# Patient Record
Sex: MALE | Race: WHITE | URBAN - METROPOLITAN AREA
[De-identification: names, ages, dates, MRNs, and addresses within clinical notes are randomized per-mention and may not be internally consistent; named-entity substitution may affect disease eponyms.]

---

## 2017-05-24 ENCOUNTER — HOSPITAL ENCOUNTER (INPATIENT)
Facility: CLINIC | Age: 42
LOS: 2 days | Discharge: HOME | DRG: 917 | End: 2017-05-26
Attending: EMERGENCY MEDICINE | Admitting: INTERNAL MEDICINE
Payer: COMMERCIAL

## 2017-05-24 ENCOUNTER — HOSPITAL ENCOUNTER (EMERGENCY)
Facility: CLINIC | Age: 42
Discharge: HOME | DRG: 917 | End: 2017-05-24
Admitting: EMERGENCY MEDICINE
Payer: COMMERCIAL

## 2017-05-24 ENCOUNTER — RESULTS ONLY (OUTPATIENT)
Dept: EMERGENCY MEDICINE | Facility: CLINIC | Age: 42
End: 2017-05-24

## 2017-05-24 ENCOUNTER — APPOINTMENT (OUTPATIENT)
Dept: CT IMAGING | Facility: CLINIC | Age: 42
DRG: 917 | End: 2017-05-24
Attending: EMERGENCY MEDICINE
Payer: COMMERCIAL

## 2017-05-24 DIAGNOSIS — F33.2 SEVERE EPISODE OF RECURRENT MAJOR DEPRESSIVE DISORDER, WITHOUT PSYCHOTIC FEATURES (H): Primary | ICD-10-CM

## 2017-05-24 DIAGNOSIS — R56.9 SEIZURES (H): ICD-10-CM

## 2017-05-24 DIAGNOSIS — T50.902A INTENTIONAL DRUG OVERDOSE, INITIAL ENCOUNTER (H): ICD-10-CM

## 2017-05-24 DIAGNOSIS — G90.81 SEROTONIN SYNDROME: ICD-10-CM

## 2017-05-24 LAB
ALBUMIN SERPL-MCNC: 3.9 G/DL (ref 3.4–5)
ALP SERPL-CCNC: 74 U/L (ref 40–150)
ALT SERPL W P-5'-P-CCNC: 35 U/L (ref 0–70)
AMPHETAMINES UR QL SCN: ABNORMAL
ANION GAP SERPL CALCULATED.3IONS-SCNC: 11 MMOL/L (ref 3–14)
ANION GAP SERPL CALCULATED.3IONS-SCNC: 16 MMOL/L (ref 3–14)
APAP SERPL-MCNC: NORMAL MG/L (ref 10–20)
APAP SERPL-MCNC: NORMAL MG/L (ref 10–20)
AST SERPL W P-5'-P-CCNC: 18 U/L (ref 0–45)
BARBITURATES UR QL: ABNORMAL
BASOPHILS # BLD AUTO: 0 10E9/L (ref 0–0.2)
BASOPHILS NFR BLD AUTO: 0.2 %
BENZODIAZ UR QL: ABNORMAL
BILIRUB SERPL-MCNC: 0.3 MG/DL (ref 0.2–1.3)
BUN SERPL-MCNC: 12 MG/DL (ref 7–30)
BUN SERPL-MCNC: 13 MG/DL (ref 7–30)
CALCIUM SERPL-MCNC: 8.3 MG/DL (ref 8.5–10.1)
CALCIUM SERPL-MCNC: 8.4 MG/DL (ref 8.5–10.1)
CANNABINOIDS UR QL SCN: ABNORMAL
CHLORIDE SERPL-SCNC: 108 MMOL/L (ref 94–109)
CHLORIDE SERPL-SCNC: 108 MMOL/L (ref 94–109)
CO2 SERPL-SCNC: 18 MMOL/L (ref 20–32)
CO2 SERPL-SCNC: 23 MMOL/L (ref 20–32)
COCAINE UR QL: ABNORMAL
CREAT SERPL-MCNC: 0.95 MG/DL (ref 0.66–1.25)
CREAT SERPL-MCNC: 1.08 MG/DL (ref 0.66–1.25)
DIFFERENTIAL METHOD BLD: NORMAL
EOSINOPHIL # BLD AUTO: 0.2 10E9/L (ref 0–0.7)
EOSINOPHIL NFR BLD AUTO: 1.7 %
ERYTHROCYTE [DISTWIDTH] IN BLOOD BY AUTOMATED COUNT: 13.2 % (ref 10–15)
ETHANOL SERPL-MCNC: <0.01 G/DL
ETHANOL SERPL-MCNC: NORMAL G/DL
GFR SERPL CREATININE-BSD FRML MDRD: 0 ML/MIN/1.7M2
GFR SERPL CREATININE-BSD FRML MDRD: 75 ML/MIN/1.7M2
GLUCOSE BLDC GLUCOMTR-MCNC: 78 MG/DL (ref 70–99)
GLUCOSE SERPL-MCNC: 123 MG/DL (ref 70–99)
GLUCOSE SERPL-MCNC: 97 MG/DL (ref 70–99)
HCT VFR BLD AUTO: 43.8 % (ref 40–53)
HGB BLD-MCNC: 14.6 G/DL (ref 13.3–17.7)
IMM GRANULOCYTES # BLD: 0 10E9/L (ref 0–0.4)
IMM GRANULOCYTES NFR BLD: 0.2 %
INTERPRETATION ECG - MUSE: NORMAL
INTERPRETATION ECG - MUSE: NORMAL
LYMPHOCYTES # BLD AUTO: 1.6 10E9/L (ref 0.8–5.3)
LYMPHOCYTES NFR BLD AUTO: 18.6 %
MAGNESIUM SERPL-MCNC: 2.5 MG/DL (ref 1.6–2.3)
MCH RBC QN AUTO: 30.4 PG (ref 26.5–33)
MCHC RBC AUTO-ENTMCNC: 33.3 G/DL (ref 31.5–36.5)
MCV RBC AUTO: 91 FL (ref 78–100)
MONOCYTES # BLD AUTO: 0.8 10E9/L (ref 0–1.3)
MONOCYTES NFR BLD AUTO: 8.7 %
NEUTROPHILS # BLD AUTO: 6.1 10E9/L (ref 1.6–8.3)
NEUTROPHILS NFR BLD AUTO: 70.6 %
OPIATES UR QL SCN: ABNORMAL
PCP UR QL SCN: ABNORMAL
PLATELET # BLD AUTO: 322 10E9/L (ref 150–450)
POTASSIUM SERPL-SCNC: 3.7 MMOL/L (ref 3.4–5.3)
POTASSIUM SERPL-SCNC: 3.9 MMOL/L (ref 3.4–5.3)
PROT SERPL-MCNC: 7.1 G/DL (ref 6.8–8.8)
RBC # BLD AUTO: 4.81 10E12/L (ref 4.4–5.9)
SALICYLATES SERPL-MCNC: NORMAL MG/DL
SODIUM SERPL-SCNC: 142 MMOL/L (ref 133–144)
SODIUM SERPL-SCNC: 142 MMOL/L (ref 133–144)
TSH SERPL DL<=0.005 MIU/L-ACNC: 3.39 MU/L (ref 0.4–4)
WBC # BLD AUTO: 8.7 10E9/L (ref 4–11)

## 2017-05-24 PROCEDURE — 25000128 H RX IP 250 OP 636: Performed by: PSYCHIATRY & NEUROLOGY

## 2017-05-24 PROCEDURE — 93005 ELECTROCARDIOGRAM TRACING: CPT | Mod: 76

## 2017-05-24 PROCEDURE — 80329 ANALGESICS NON-OPIOID 1 OR 2: CPT | Performed by: EMERGENCY MEDICINE

## 2017-05-24 PROCEDURE — 80307 DRUG TEST PRSMV CHEM ANLYZR: CPT | Performed by: EMERGENCY MEDICINE

## 2017-05-24 PROCEDURE — 25000128 H RX IP 250 OP 636

## 2017-05-24 PROCEDURE — 85025 COMPLETE CBC W/AUTO DIFF WBC: CPT | Performed by: EMERGENCY MEDICINE

## 2017-05-24 PROCEDURE — 99223 1ST HOSP IP/OBS HIGH 75: CPT | Mod: AI | Performed by: INTERNAL MEDICINE

## 2017-05-24 PROCEDURE — 20000003 ZZH R&B ICU

## 2017-05-24 PROCEDURE — 84443 ASSAY THYROID STIM HORMONE: CPT | Performed by: EMERGENCY MEDICINE

## 2017-05-24 PROCEDURE — 25000128 H RX IP 250 OP 636: Performed by: EMERGENCY MEDICINE

## 2017-05-24 PROCEDURE — 70450 CT HEAD/BRAIN W/O DYE: CPT

## 2017-05-24 PROCEDURE — 80053 COMPREHEN METABOLIC PANEL: CPT | Performed by: EMERGENCY MEDICINE

## 2017-05-24 PROCEDURE — 25000125 ZZHC RX 250: Performed by: INTERNAL MEDICINE

## 2017-05-24 PROCEDURE — 00000146 ZZHCL STATISTIC GLUCOSE BY METER IP

## 2017-05-24 PROCEDURE — 25000125 ZZHC RX 250: Performed by: EMERGENCY MEDICINE

## 2017-05-24 PROCEDURE — 99291 CRITICAL CARE FIRST HOUR: CPT | Mod: 25

## 2017-05-24 PROCEDURE — 93005 ELECTROCARDIOGRAM TRACING: CPT

## 2017-05-24 PROCEDURE — 99292 CRITICAL CARE ADDL 30 MIN: CPT

## 2017-05-24 PROCEDURE — 83735 ASSAY OF MAGNESIUM: CPT | Performed by: EMERGENCY MEDICINE

## 2017-05-24 PROCEDURE — 80320 DRUG SCREEN QUANTALCOHOLS: CPT | Performed by: EMERGENCY MEDICINE

## 2017-05-24 RX ORDER — LIDOCAINE 40 MG/G
CREAM TOPICAL
Status: DISCONTINUED | OUTPATIENT
Start: 2017-05-24 | End: 2017-05-26 | Stop reason: HOSPADM

## 2017-05-24 RX ORDER — NALOXONE HYDROCHLORIDE 0.4 MG/ML
.1-.4 INJECTION, SOLUTION INTRAMUSCULAR; INTRAVENOUS; SUBCUTANEOUS
Status: DISCONTINUED | OUTPATIENT
Start: 2017-05-24 | End: 2017-05-26 | Stop reason: HOSPADM

## 2017-05-24 RX ORDER — SODIUM CHLORIDE 9 MG/ML
1000 INJECTION, SOLUTION INTRAVENOUS CONTINUOUS
Status: DISCONTINUED | OUTPATIENT
Start: 2017-05-24 | End: 2017-05-24

## 2017-05-24 RX ORDER — ONDANSETRON 2 MG/ML
4 INJECTION INTRAMUSCULAR; INTRAVENOUS EVERY 6 HOURS PRN
Status: DISCONTINUED | OUTPATIENT
Start: 2017-05-24 | End: 2017-05-26 | Stop reason: HOSPADM

## 2017-05-24 RX ORDER — ONDANSETRON 4 MG/1
4 TABLET, ORALLY DISINTEGRATING ORAL EVERY 6 HOURS PRN
Status: DISCONTINUED | OUTPATIENT
Start: 2017-05-24 | End: 2017-05-26 | Stop reason: HOSPADM

## 2017-05-24 RX ORDER — POTASSIUM CHLORIDE 29.8 MG/ML
20 INJECTION INTRAVENOUS
Status: DISCONTINUED | OUTPATIENT
Start: 2017-05-24 | End: 2017-05-26 | Stop reason: HOSPADM

## 2017-05-24 RX ORDER — POTASSIUM CHLORIDE 1500 MG/1
20-40 TABLET, EXTENDED RELEASE ORAL
Status: DISCONTINUED | OUTPATIENT
Start: 2017-05-24 | End: 2017-05-26 | Stop reason: HOSPADM

## 2017-05-24 RX ORDER — LORAZEPAM 2 MG/ML
2 INJECTION INTRAMUSCULAR
Status: DISCONTINUED | OUTPATIENT
Start: 2017-05-24 | End: 2017-05-26 | Stop reason: HOSPADM

## 2017-05-24 RX ORDER — ACETAMINOPHEN 650 MG/1
650 SUPPOSITORY RECTAL EVERY 4 HOURS PRN
Status: DISCONTINUED | OUTPATIENT
Start: 2017-05-24 | End: 2017-05-26 | Stop reason: HOSPADM

## 2017-05-24 RX ORDER — AMOXICILLIN 250 MG
1-2 CAPSULE ORAL 2 TIMES DAILY PRN
Status: DISCONTINUED | OUTPATIENT
Start: 2017-05-24 | End: 2017-05-26 | Stop reason: HOSPADM

## 2017-05-24 RX ORDER — POTASSIUM CHLORIDE 7.45 MG/ML
10 INJECTION INTRAVENOUS
Status: DISCONTINUED | OUTPATIENT
Start: 2017-05-24 | End: 2017-05-26 | Stop reason: HOSPADM

## 2017-05-24 RX ORDER — SODIUM CHLORIDE 9 MG/ML
INJECTION, SOLUTION INTRAVENOUS CONTINUOUS
Status: DISCONTINUED | OUTPATIENT
Start: 2017-05-24 | End: 2017-05-24

## 2017-05-24 RX ORDER — LORAZEPAM 2 MG/ML
1 INJECTION INTRAMUSCULAR EVERY 6 HOURS
Status: DISCONTINUED | OUTPATIENT
Start: 2017-05-24 | End: 2017-05-25

## 2017-05-24 RX ORDER — LORAZEPAM 2 MG/ML
2 INJECTION INTRAMUSCULAR EVERY 5 MIN PRN
Status: DISCONTINUED | OUTPATIENT
Start: 2017-05-24 | End: 2017-05-24

## 2017-05-24 RX ORDER — POTASSIUM CHLORIDE 1.5 G/1.58G
20-40 POWDER, FOR SOLUTION ORAL
Status: DISCONTINUED | OUTPATIENT
Start: 2017-05-24 | End: 2017-05-26 | Stop reason: HOSPADM

## 2017-05-24 RX ORDER — POTASSIUM CL/LIDO/0.9 % NACL 10MEQ/0.1L
10 INTRAVENOUS SOLUTION, PIGGYBACK (ML) INTRAVENOUS
Status: DISCONTINUED | OUTPATIENT
Start: 2017-05-24 | End: 2017-05-26 | Stop reason: HOSPADM

## 2017-05-24 RX ORDER — ACETAMINOPHEN 325 MG/1
650 TABLET ORAL EVERY 4 HOURS PRN
Status: DISCONTINUED | OUTPATIENT
Start: 2017-05-24 | End: 2017-05-26 | Stop reason: HOSPADM

## 2017-05-24 RX ORDER — CITALOPRAM HYDROBROMIDE 20 MG/10ML
40 SOLUTION, ORAL ORAL DAILY
COMMUNITY
End: 2017-05-24

## 2017-05-24 RX ORDER — LORAZEPAM 2 MG/ML
INJECTION INTRAMUSCULAR
Status: COMPLETED
Start: 2017-05-24 | End: 2017-05-24

## 2017-05-24 RX ORDER — LIDOCAINE 40 MG/G
CREAM TOPICAL
Status: DISCONTINUED | OUTPATIENT
Start: 2017-05-24 | End: 2017-05-24

## 2017-05-24 RX ORDER — LORAZEPAM 2 MG/ML
1 INJECTION INTRAMUSCULAR ONCE
Status: COMPLETED | OUTPATIENT
Start: 2017-05-24 | End: 2017-05-24

## 2017-05-24 RX ADMIN — SODIUM CHLORIDE 1000 ML: 9 INJECTION, SOLUTION INTRAVENOUS at 11:41

## 2017-05-24 RX ADMIN — SODIUM CHLORIDE 1000 ML: 9 INJECTION, SOLUTION INTRAVENOUS at 12:43

## 2017-05-24 RX ADMIN — LORAZEPAM 1 MG: 2 INJECTION INTRAMUSCULAR; INTRAVENOUS at 13:56

## 2017-05-24 RX ADMIN — SODIUM CHLORIDE 1000 ML: 9 INJECTION, SOLUTION INTRAVENOUS at 13:46

## 2017-05-24 RX ADMIN — LORAZEPAM 1 MG: 2 INJECTION INTRAMUSCULAR; INTRAVENOUS at 16:21

## 2017-05-24 RX ADMIN — Medication 2 G: at 11:42

## 2017-05-24 RX ADMIN — LORAZEPAM 1 MG: 2 INJECTION INTRAMUSCULAR; INTRAVENOUS at 12:00

## 2017-05-24 RX ADMIN — LORAZEPAM 1 MG: 2 INJECTION INTRAMUSCULAR; INTRAVENOUS at 11:30

## 2017-05-24 RX ADMIN — DEXTROSE AND SODIUM CHLORIDE: 5; 900 INJECTION, SOLUTION INTRAVENOUS at 20:10

## 2017-05-24 ASSESSMENT — ENCOUNTER SYMPTOMS
HALLUCINATIONS: 1
SEIZURES: 1
NERVOUS/ANXIOUS: 1

## 2017-05-24 NOTE — PHARMACY-ADMISSION MEDICATION HISTORY
Admission medication history interview status for the 5/24/2017  admission is complete. See EPIC admission navigator for prior to admission medications     Medication history source reliability:Good    Actions taken by pharmacist (provider contacted, etc): called [facility] to verify prescription medications.   Last fill of Celexa was 4/24/17 for a 30 day supply.  Last fill for Wellbutrin was 5/20/17 for a 30 day supply.      Additional medication history information not noted on PTA med list :None    Medication reconciliation/reorder completed by provider prior to medication history? No    Time spent in this activity: 15 min    Prior to Admission medications    Medication Sig Last Dose Taking? Auth Provider   BuPROPion HCl (WELLBUTRIN XL PO) Take 300 mg by mouth daily unknown Yes Unknown, Entered By History   Citalopram Hydrobromide (CELEXA PO) Take 40 mg by mouth daily unknown Yes Unknown, Entered By History

## 2017-05-24 NOTE — IP AVS SNAPSHOT
MRN:[id number]                      After Visit Summary   5/24/2017    Malik Smiley    MRN: [id number]           Thank you!     Thank you for choosing [facility] for your care. Our goal is always to provide you with excellent care. Hearing back from our patients is one way we can continue to improve our services. Please take a few minutes to complete the written survey that you may receive in the mail after you visit with us. Thank you!        Patient Information     Date Of Birth          1975        Designated Caregiver       Most Recent Value    Caregiver    Will someone help with your care after discharge? yes    Name of designated caregiver manju    Phone number of caregiver [phone]    Caregiver [place]      About your hospital stay     You were admitted on:  May 24, 2017 You last received care in the:  [facility]    You were discharged on:  May 26, 2017        Reason for your hospital stay       You have been admitted for evaluation after drug overdose with Wellbutrin you were seen by Neurology and monitored in ICU initially; your condition has improved; you were seen by Psychiatry who recommended you to stop Wellbutrin and continue with Celexa at a lower dose 20 mg po daily and to follow up with your psychiatrist soon.                  Who to Call     For medical emergencies, please call 911.  For non-urgent questions about your medical care, please call your primary care provider or clinic, [phone]          Attending Provider     Provider Specialty    Ted Hood DO Emergency Medicine    Lilian Mukherjee MD Internal Medicine       Primary Care Provider Office Phone # Fax #    Teo Morales [phone]       [address] [place] [place] [address]         When to contact your care team       Call your primary doctor or return to ER if you have any of the following:  "temperature greater than 100.5 or less than 95, worsening depression, suicidal ideation, agitation, confusion, hallucinations, dizziness, loss of consciousness, seizures, nausea, vomiting..                  After Care Instructions     Activity       Your activity upon discharge: activity as tolerated and no driving for 2 days or until mentation clears completely.            Diet       Follow this diet upon discharge: Orders Placed This Encounter      Room Service      Advance Diet as Tolerated: Regular Diet Adult                  Follow-up Appointments     Follow-up and recommended labs and tests        Follow up with primary care provider, Teo Morales, 8am on Friday June 2nd, (within 7 days for hospital follow- up).  No follow up labs or test are needed.    Follow up with primary Psychiatrist Dr Rodgers on Tuesday May 30st at 9:20 am then on July 10th.  Please call the clinic [phone] you have questions about your appointment.                  Pending Results     No orders found from 5/22/2017 to 5/25/2017.            Statement of Approval     Ordered          05/26/17 1328  I have reviewed and agree with all the recommendations and orders detailed in this document.  EFFECTIVE NOW     Approved and electronically signed by:  Lilian Mukherjee MD             Admission Information     Date & Time Provider Department Dept. Phone    5/24/2017 Lilian Mukherjee MD [address] [facility] [phone]      Your Vitals Were     Blood Pressure Pulse Temperature Respirations Height Weight    116/68 (BP Location: Right arm) 136 98.5  F (36.9  C) (Oral) 16 1.778 m (5' 10\") 82.6 kg (182 lb 1.6 oz)    Pulse Oximetry BMI (Body Mass Index)                94% 26.13 kg/m2          [employer] Information     [employer] lets you send messages to your doctor, view your test results, renew your prescriptions, schedule appointments and more. To sign up, go to [email or web address] . Click on \"Log in\" on " "the left side of the screen, which will take you to the Welcome page. Then click on \"Sign up Now\" on the right side of the page.     You will be asked to enter the access code listed below, as well as some personal information. Please follow the directions to create your username and password.     Your access code is: [email or web address]  Expires: 2017  1:08 PM     Your access code will  in 90 days. If you need help or a new code, please call your [facility] clinic or [phone].        Care EveryWhere ID     This is your Care EveryWhere ID. This could be used by other organizations to access your [facility] medical records  [id number]           Review of your medicines      CONTINUE these medicines which may have CHANGED, or have new prescriptions. If we are uncertain of the size of tablets/capsules you have at home, strength may be listed as something that might have changed.        Dose / Directions    citalopram 20 MG tablet   Commonly known as:  celeXA   This may have changed:    - medication strength  - how much to take   Used for:  Severe episode of recurrent major depressive disorder, without psychotic features (H)        Dose:  20 mg   Take 1 tablet (20 mg) by mouth daily   Quantity:  60 tablet   Refills:  0         STOP taking     WELLBUTRIN XL PO                Where to get your medicines      These medications were sent to [facility] [place], [place] - [address]  [address], [place] [address]     Phone:  [phone]     citalopram 20 MG tablet                Protect others around you: Learn how to safely use, store and throw away your medicines at www.disposemymeds.org.             Medication List: This is a list of all your medications and when to take them. Check marks below indicate your daily home schedule. Keep this list as a reference.      Medications           Morning Afternoon Evening Bedtime As Needed    citalopram 20 MG tablet   Commonly known as:  celeXA "   Take 1 tablet (20 mg) by mouth daily   Last time this was given:  10 mg on 5/26/2017  1:17 PM

## 2017-05-24 NOTE — PLAN OF CARE
Problem: Goal Outcome Summary  Goal: Goal Outcome Summary  Outcome: Improving  Pt alert and oriented to person and place. Calm and cooperative, follows direction. Slow to respond, forgetful and continues w/ visual hallucinations.Pt seeing bugs and people in the room. Bilateral upper extremity tremors and clonus noted to bilateral feet. Roving eye movements.  Received 1mg of scheduled IV ativan this shift. Bite marks to lateral tongue edges. No seizure activity noted, precautions in place. IVF infusing. Family was present at bedside and updated. Neurology following and psychiatry to consult. Continue to monitor.

## 2017-05-24 NOTE — ED NOTES
Pt had seizure during transport up ICU. Dr Hood aware. Will return to [facility] for further evaluation

## 2017-05-24 NOTE — ED NOTES
[facility]  ED Nurse Handoff Report    ED Chief complaint: Seizures (Pt seen in ED last nigjht for Wellbutrin OD. Sent home at 0600 with family. Pt had a witnessed seizure at home and by medics lasting approx 1 minute. Hallucinating at 1030 per family.)      ED Diagnosis:   Final diagnoses:   Seizures (H)       Code Status: Full Code    Allergies: No Known Allergies    Activity level - Baseline/Home:  Stand with Assist    Activity Level - Current:   Stand with Assist     Needed?: No    Isolation: No  Infection: Not Applicable    Bariatric?: No    Vital Signs:   Vitals:    05/24/17 1145 05/24/17 1200 05/24/17 1219 05/24/17 1228   BP: 133/86 127/84 118/76    Pulse:       Resp: 22 15 13    Temp:    98.8  F (37.1  C)   TempSrc:    Oral   SpO2: 95% 96% 97%    Height:           Cardiac Rhythm: ,   Cardiac  Cardiac Rhythm: Sinus tachycardia    Pain level:      Is this patient confused?: Yes    Patient Report: Initial Complaint: 911 was called by mother stating he a seizure and was confused.  Focused Assessment: Seizure noted per EMS. Pt tremorous with clonus on arrival. Asking repetitive questions. Confused to date. Hallucinations at home.  Tests Performed: CT, Labs. Unable to get urine at this time.  Abnormal Results: see fcis  Treatments provided: Fluids, See MAR    Family Comments: Wife, sister and mother at the bedside    OBS brochure/video discussed/provided to patient: N/A    ED Medications:   Medications   lidocaine 1 % 1 mL (not administered)   lidocaine (LMX4) cream (not administered)   sodium chloride (PF) 0.9% PF flush 3 mL (not administered)   sodium chloride (PF) 0.9% PF flush 3 mL (not administered)   LORazepam (ATIVAN) injection 2 mg (1 mg IV/IM Given 5/24/17 1130)   0.9% sodium chloride BOLUS (0 mLs Intravenous Stopped 5/24/17 1241)     Followed by   0.9% sodium chloride infusion (not administered)   0.9% sodium chloride BOLUS (1,000 mLs Intravenous New Bag 5/24/17 1243)    magnesium sulfate 2 g in NS intermittent infusion ([facility] or [facility]) (0 g Intravenous Stopped 5/24/17 1220)   LORazepam (ATIVAN) injection 1 mg (1 mg Intravenous Given 5/24/17 1200)       Drips infusing?:  No      ED NURSE PHONE NUMBER: [phone]

## 2017-05-24 NOTE — ED PROVIDER NOTES
History     Chief Complaint:  Seizures      HPI   Malik Smiley is a 42 year old male with a past medical history of depression and anxeity who presents via EMS for evaluation of seizures. The patient was seen in the ED yesterday after he called his wife who was out of town and told her he overdosed on his medications. Wife called the police and was concerned that he overdosed on Wellbutrin.  The patient did deny taking any pills at that time. After observation and negative lab workup the patient was discharged home. At 10am he had a witnessed seizure today at home; 911 was called. By the time EMS got there, the patient s seizure has resolved. He did have another seizure with mostly arms and body shaking and was brought to the ED for further evaluation. EMS report the patient is confused, but was alert and talking. Here in the ED, the patient reports he had taken about twenty pills of either Celexa or Wellbutrin in suicide attempt. He denies any pain. The patient did recently start a new job after staying home with his son for nine months and this has been causing him increased anxiety. He also stopped using marijuana a few months ago. The patient has been to an outpatient program in the past for depression, but does not have a regular therapist.      Mother and wife are at bedside. Mother reports the patient was downstairs this morning around 1000 when she started to hear noises coming from him.  The patient told her there were mice under the bed and he was trying to get them out, but mother did not see anything. Family was able to witness a seizure which lasted for 30 seconds to a minute. The patient s whole body was shaking during his seizure and mother called 911. Family is unsure if there are pills missing from his prescription bottles or if the patient took pills after being discharged from the hospital last night. Wife denies any history of seizures and the patient has not attempted suicide in the past.  "Wife states the patient had a couple of drinks last night, but is not sure how much.     Allergies:  NKDA    Medications:    Celexa  Wellbutrin    Past Medical History:    Depression  Anxiety   Past Surgical History:    History reviewed. No pertinent past surgical history.     Family History:    History reviewed.  No significant family history.     Social History:  Marital Status:     Smoking status: Unknown  Alcohol use: Yes     Review of Systems   Neurological: Positive for seizures.   Psychiatric/Behavioral: Positive for hallucinations and suicidal ideas. The patient is nervous/anxious.      Physical Exam   First Vitals:  BP: 148/90  Pulse: 136  Heart Rate: 138  Temp: 98.8  F (37.1  C)  Resp: 19  Height: 177.8 cm (5' 10\")  SpO2: 94 %    Physical Exam  General: Alert and cooperative with exam. Patient in moderate distress. Normal mentation.  Head:  Scalp is NC/AT  Eyes:  No scleral icterus, PERRL, EOMI   ENT:  The external nose and ears are normal. The oropharynx is normal and without erythema; mucus membranes are somewhat dry.  Neck:  Normal range of motion without rigidity.  CV:  Tachcycardic and regular rhythm  Resp:  Breath sounds are clear bilaterally    Non-labored, no retractions or accessory muscle use  GI:  Abdomen is soft, no distension, no tenderness. No peritoneal signs  MS:  No lower extremity edema   Skin:  Warm and dry, No rash or lesions noted.  Neuro: Oriented x 3. No gross motor deficits.     Strength and sensation grossly intact in all 4 extremities.       Cranial nerves 2-12 intact.     GCS: 15    Significant inducable clonus in lower ext, hyperreflexia  Psych:  Awake. Alert. Endorse depression with suicide attempt with intentional overdose, concern for visual hallucinations. Mildly agitated.     Emergency Department Course   ECG @ 1130  Indication: Seizures  Rate 138 bpm.   MD interval 138 ms.   QRS duration 98 ms.   QT/QTc 368/557 ms.   P-R-T axes 29.  Notes: Sinus tachycardia. " Nonspecific ST & T wave abnormality.    Time read 1135    ECG @ 1403  Indication: Repeat  Rate 133 bpm.   NY interval 146 ms.   QRS duration 98 ms.   QT/QTc 286/425 ms.   P-R-T axes 30.  Notes: Sinus tachycardia. Nonspecific T wave abnormality.   Time read 1451    Imaging:  Radiographic findings were communicated with the patient's wife and mother who voiced understanding of the findings.    CT Head w/o Contrast  Final Result  IMPRESSION: Normal head CT.      Radiation dose for this scan was reduced using automated exposure  control, adjustment of the mA and/or kV according to patient size, or  iterative reconstruction technique.    PENNY ROTHMAN MD      Laboratory:  CBC: WBC 8.7 (WNL) HGB 14.6 (WNL)  (WNL)   CMP: Cr 1.08 (WNL) Glucose 123 (H) Carbon dioxide 18 (L) Anion gap 16 (H) Calcium 8.4 (L) Rest WNL  Magnesium: 2.5 (H)  Drug abuse screen: Amphetamine Positive (A) Rest Negative  Blood alcohol: <0.01 (WNL)  Acetaminophen: <2 (WNL)  Salicylate: <2 (WNL)  TSH: 3.39 (WNL)    Interventions:  1130: Ativan, 1 mg, IV  1141: Normal saline, 1000 ml, IV  1142: Magnesium, 2 g, IV  1243: Normal saline, 1000 ml, IV  1200: Ativan, 1 mg, IV  1243: Normal saline, 1000 ml, IV  1356:  Ativan, 1 mg, IV      ED Course:  The patient arrived by ambulance. He was escorted to the ED where his vitals were measured and recorded.   Nursing notes and past medical history reviewed.   I performed a physical examination of the patient as documented above.  I explained the plan with the patient's wife and mother who consents to this.   The above workups were undertaken.   The patient was placed on a cardiac monitor.  1140: Wife and mother are at bedside.   1150: The patient was discussed with poison control.  1246: The patient was discussed with Dr. Mukherjee of hospitalist service.   1358: The patient had another seizure while being brought upstairs and was given 1 mg ativan.   1400:  The patient was discussed with Dr. Mukherjee and she  recommended neurology consultation.   1406: The patient was discussed with Dr. Najera of neurology.   I personally reviewed the laboratory and imaging results with the Patient's wife and mother and answered all related questions prior to admission.  Findings and plan explained to the spouse and mother who consents to admission. Discussed the patient with Dr. Mukherjee, who will admit the patient to an adult ICU bed for further monitoring, evaluation, and treatment.     Impression & Plan      Medical Decision Making:  Malik Smiley is a 42 year old male who presents with reported seizure as well as recent history of intentional drug overdose (Wellbutrin and/or celexa). The patient's medical history and records were reviewed. Initial consideration for, but not limited to, seizure secondary to drug overdose, electrolyte abnormality, intracranial bleeds pathology, infectious process, arrhythmia, toxic ingestion, among others. Labs, EKG and imaging were obtained. Initial EKG demonstrates sinus tachycardia with prolonged QT, therefore the patient was provided with 2 g of magnesium IV. Initial seizure resolved right at the time of ED arrival. The patient was provided a total of 2 mg of Ativan initially. The patient did endorse intentional overdose, but is unable to quantify how much or what medication he may have taken. Family is concerned that he may have overdosed after discharge from ED yesterday. Additionally, the patient was noted to have hallucinations. The patient remained tachycardic throughout ED encounter and demonstrated evidence of significant of hyperreflexia as well as inducible colus on exam which is concerning for serotonin syndrome. The case was discussed with [place] poison control who recommended laboratory evaluation, supportive care with benzodiazepines and fluids PRN. Labs notable for amphetamine positive UDS, mild elevation of anion gap (16, likely secondary to seizure activity), negative alcohol  level, negative Tylenol/salicylate, TSH within normal range, and blood sugar greater than 70. Head CT is unremarkable for intracranial pathology. The patient will be admitted into the ICU with the hospitalist service. En route to ICU, the patient had a third witnessed seizure that lasted 30-60 seconds and aborted spontaneously. The patient was provided addition 1 mg of Ativan and observed for an additional 30 minutes in the ED. The case with discussed with Dr. Najera of neurology service at hospitalist request, she will evaluate the patient in the ICU.  Presentation is consistent with intentional drug overdose with associated serotonin syndrome and seizure. The patient will require inpatient psychiatric evaluation and he was placed on a 72 hour hold.     Critical care time: 45 minutes      Diagnosis:    ICD-10-CM    1. Seizures (H) R56.9 Drug abuse screen 77 urine ([place],[facility])     Glucose by meter     Glucose by meter   2. Serotonin syndrome G25.79    3. Intentional drug overdose, initial encounter (H) T50.902A        Disposition:   Admit to an adult ICU bed under the care of Dr. Mukherjee.       ILizbeth, am serving as a scribe on 5/24/2017 at 11:46 AM to personally document services performed by Ted Hood DO based on my observations and the provider's statements to me.         Ted Hood DO  05/24/17 4888

## 2017-05-24 NOTE — ED PROVIDER NOTES
CHIEF COMPLAINT:  Crisis evaluation.      HISTORY OF PRESENT ILLNESS:    Malik Smiley is a 42-year-old male with a history of anxiety and depression who presented to the Emergency Department for evaluation of anxiety and depression.  The patient states that he started a new job yesterday after being off for nine months to take care of his 13-month-old child.  He states that his wife has been out of town, and he has been having increased depression.  Tonight he texted his wife that he was going to take all his medications, and she subsequently called the police.  The patient adamantly denies that he took any of the pills.  The patient states that it was a mistake in saying this, and he notes that he would never try to hurt himself.  Tonight he did have 3-4 beers, and he states that he does not typically drink alcohol.  He also reports that he recently stopped using marijuana a few months ago.  In the past, the patient did complete an outpatient program for depression; however, he is not seeing a therapist.  He denies any physical complaints.  He last saw a psychiatrist about one-and-a-half months ago, and at that time he was restarted on Celexa.  He reports that he would never do anything to hurt himself, as he has a wife and a son. No prior suicide attempts. No prior psych hospitalizations. Denies drug use.      ALLERGIES:  No known drug allergies.      MEDICATIONS:  Wellbutrin, Celexa.      PAST MEDICAL HISTORY:  Depression, anxiety.      PHYSICAL EXAM:   VITAL SIGNS:  Blood pressure 98/71, pulse 79, temperature 97.6, oxygen saturation 97% on room air, respiratory rate 16.  At 4:23 a.m., repeat vital signs were heart rate of 82, respiratory rate 16, blood pressure 108/75, oxygen saturation 96% on room air.   GENERAL:  Resting comfortably on the gurney.   EYES:  Pupils are equal and round. Pupils mid point. Conjunctivae and sclerae are normal.   ENT:  NOSE, THROAT:  Moist mucous membranes.   NECK:  Normal range of  motion.   CARDIOVASCULAR:  Regular rate and rhythm.   SKIN:  Warm and well perfused.   RESPIRATORY:  Lungs are clear bilaterally with nonlabored breathing with no rales and no wheezing.   MUSCULOSKELETAL:  Normal muscular tone.   SKIN:  No rash or acute skin lesions noted.  No diaphoresis.   NEURO:  Awake and alert.  Speech is normal and fluent.  Face is symmetric.  Moves all extremities equally.   PSYCH:  Normal affect.  Good eye contact.  No evidence of anxiety or agitation.  Appropriate interactions here in the Emergency Department.     ROS: 10 point review of systems obtained and negative other than mentioned above     LABORATORY VALUES:  BMP within normal limits.  Tylenol negative less than 2.0.  Alcohol less than 0.01.      EKG shows sinus rhythm with a rate of 81, with QRS of 76, QTc of 457.      MEDICAL DECISION MAKING:  Malik Smiley is a 42-year-old male with history of depression and anxiety who presented to the Emergency Department for crisis evaluation.  Vital signs were normal on arrival to the Emergency Department.  He has an unremarkable physical exam.  He does not show any evidence of serotonin syndrome or toxidrome at this time.  He denies having any thoughts of wanting to hurt himself at this time.  He does state that he did have some thoughts earlier tonight about wanting to overdose, though he did not do this, and he is adamantly denying that he wants to do this.  He reports he has too many things to live for, including his wife and [id number] son.  He is under new stress recently as he just started a new job yesterday and previously had been at home with his son who now has to go to . He continues to show no evidence of ingestion on exam.  Tylenol level is negative.  BMP is normal.  EKG shows normal intervals.  Repeat vital signs were obtained again and normal.  He continues to deny report of any ingestion.  DEC evaluated the patient via Telehealth, and felt that he could be discharged  home with someone monitoring him, and his mother came to the Emergency Department and feels comfortable taking him home and staying with him.  DEC also spoke with patient's wife over the phone. His wife is currently out of town and is actually flying back later this morning as well, and his mom will stay with him until his wife gets back.  He does have a psychiatrist and has been taking his medications, but it would be helpful for him to see a therapist.  Thus, will set up a therapist appointment in the morning, as it is unable to be done at this time as it is the middle of the night.  He continues to show no signs of serious ingestion after being monitored in the ED, and I do think that discharge home with family is appropriate, as he is forward thinking, continues to deny any suicidal plan/thoughts. He has no prior suicide attempts. He also has supportive family with him. Discussed plan with his mother who is in ED. Reasons to return to the Emergency Department were discussed with the patient.         RUBEN CONTRERAS MD             D: 2017 05:13   T: 2017 06:34   MT: DANIELLA#[id number]      Name:     RODOLFO MCCABE   MRN:      [id number]        Account:      [id number]   :      1975           Visit Date:   2017      Document: [id number]

## 2017-05-24 NOTE — CONSULTS
Please see dictation for full details    42-year-old male admitted with overdose of Wellbutrin and possibly Celexa. Has had three seizures in ER today. Also noted to have QT prolongation, tremor, clonus at the ankles.     Patient has received 3 mg of Ativan.  Seizure precautions  Tox screen positive for amphetamines.    I don't think there would be benefit to load anti-epileptics as the patient is seizing due to medicine toxicity.    Typical treatment is benzodiazepines.     The patient has findings consistent with serotonin syndrome with QT prolongation and clonus and tremor. Will schedule low dose Ativan and then if breakthrough seizures he can receive the prn dose. If seizures are prolonged we will need to intubate and sedated with midazolam.    If hyperthermia develops consider starting cyproheptadine.    Melba Najera MD  [facility]  5/24/2017 3:21 PM

## 2017-05-24 NOTE — IP AVS SNAPSHOT
[address] [facility]    [facility] [address]    [place] [address]    Phone:  [phone]                                       After Visit Summary   5/24/2017    Malik Smiley    MRN: [id number]           After Visit Summary Signature Page     I have received my discharge instructions, and my questions have been answered. I have discussed any challenges I see with this plan with the nurse or doctor.    ..........................................................................................................................................  Patient/Patient Representative Signature      ..........................................................................................................................................  Patient Representative Print Name and Relationship to Patient    ..................................................               ................................................  Date                                            Time    ..........................................................................................................................................  Reviewed by Signature/Title    ...................................................              ..............................................  Date                                                            Time

## 2017-05-24 NOTE — CONSULTS
REFERRING PROVIDER:  Lilian Mukherjee MD      REASON FOR CONSULTATION:  Seizures.      HISTORY OF PRESENT ILLNESS:  Malik Smiley is a 42-year-old gentleman who was admitted from the Emergency Department for suspected overdose on Wellbutrin.  The patient recently has been depressed and was seen in the Emergency Department today and then came back after being found having seizures.  The patient has had 3 seizures today, all of which are short lasting.  He has received 3 mg of Ativan.  I believe the longest seizure was estimated to be about a minute.      The patient does have a history of depression, has never actively tried to overdose before and the family is not clear exactly where he got the medications.  They are not really sure if he is missing any pills.      I believe this is felt to be triggered by trying to return to work after having stayed at home with his son recently.      The patient's most recent seizure was an hour and a half ago, self-resolved in the Emergency Department.      ALLERGIES:  No known medication allergies.      CURRENT MEDICATIONS:  Celexa and Wellbutrin.      PAST MEDICAL HISTORY:  Depression with history of suicidal ideation, anxiety.      PAST SURGICAL HISTORY:  Not pertinent.      FAMILY HISTORY:  Not pertinent.      SOCIAL HISTORY:  The patient is  Apparently, he used to use marijuana, but stopped smoking several months ago.  He does drink alcohol and had several drinks last night.      REVIEW OF SYSTEMS:  Unable to obtain due to patient's cognitive status.      PHYSICAL EXAMINATION:   VITAL SIGNS:  Temperature 97.8, pulse is 136 with a range of 124-144, respiratory rate 25, blood pressure 117/91, pulse oximetry 99% on room air.   GENERAL:  The patient is lying in the hospital bed, clearly confused, cooperative with examination to the best of his ability.   NEUROLOGIC:  The patient is awake.  He knows he is in the hospital.  He has difficulty remembering the day and the date.   He has difficulty following complex commands.  He is somewhat slow to respond.  He has evidence that he has bitten the side of his tongue, more so on the left than the right.  Cranial nerves II-XII are intact.  The patient has tremors in the hands.  He has clonus in both ankles and Babinski responses bilaterally.  Muscle tone in the lower extremities is increased.  In the upper extremities, it seems okay.  Finger taps are slowed.  Muscle bulk, tone and strength appear normal, although the patient has difficulty following testing instructions to do full thorough testing.  Reflexes are brisk in the upper and lower extremities.  Sensation to light touch appears normal in all 4 extremities, as does vibration.   CARDIOVASCULAR:  Tachycardic.   LUNGS:  Clear to auscultation bilaterally.   EXTREMITIES:  No significant peripheral edema.   ABDOMEN:  Soft, nontender, nondistended with active bowel sounds.      INVESTIGATIONS:  Laboratory testing reveals a positive amphetamine on drug screen.  CMP shows normal findings except for a low carbon dioxide level of 18.  Calcium slightly low at 8.4.  Magnesium slightly high at 2.5.  Glucose was 97 initially in the ER, dropped to 78.  CBC normal.  Tylenol level and salicylate levels normal.  CT head was reviewed and is normal.  EKG shows a prolonged QT interval.      IMPRESSION AND PLAN:  Malik is being admitted after an overdose, most likely intentional, although the exact occurrence is unclear.  He most likely overdosed on Wellbutrin.  This would be at high risk of causing seizures.  Peak time for seizures is typically 6-24 hours after ingestion.  It is not clear when he took this medicine.      The patient should be treated with supportive care with fluids, seizure precaution pads and then benzodiazepines.  I do not think loading antiepileptics is the appropriate course of action right now for something that is felt to be temporarily related to an overdose.  I will schedule  lorazepam 1 mg q. 6 hours and this can be discontinued tomorrow if he is doing better.  The nurses also have a lorazepam 2 mg IV push order if the patient has another seizure.  I do think if he has another seizure that does not respond to the initial push, consideration for intubation and sedation with midazolam for the next 24 hours would be appropriate.      The patient most likely has some serotonin syndrome.  He has findings consistent with some increased muscle tone, clonus and tremor, as well as the confusion and the seizures.  I think right now the benzodiazepines, hydration and supportive care would be important.  I think if the patient develops any significant hypertension or worse muscle rigidity, consideration for initiation of cyproheptadine could be added on.      Dr. Mukherjee is the Hospitalist who admitted the patient and is the primary care provider for the patient during this hospital stay.  Neurology will provide consultative services.  I have discussed with the family.  I have also notified the intensivist that if seizures recur, we will ask them to intubate and sedate with midazolam.  Dr. Sotelo will see the patient tomorrow.      Thank you for the consultation.  Please contact me with any questions or concerns.         THIAGO WORTHY MD             D: 2017 15:39   T: 2017 16:01   MT: TS      Name:     RODOLFO MCCABE   MRN:      [id number]        Account:       [id number]   :      1975           Consult Date:  2017      Document: [id number]       cc: Lilian Mukherjee MD

## 2017-05-25 LAB
ALBUMIN SERPL-MCNC: 3 G/DL (ref 3.4–5)
ALP SERPL-CCNC: 58 U/L (ref 40–150)
ALT SERPL W P-5'-P-CCNC: 28 U/L (ref 0–70)
ANION GAP SERPL CALCULATED.3IONS-SCNC: 8 MMOL/L (ref 3–14)
AST SERPL W P-5'-P-CCNC: 18 U/L (ref 0–45)
BILIRUB DIRECT SERPL-MCNC: <0.1 MG/DL (ref 0–0.2)
BILIRUB SERPL-MCNC: 0.4 MG/DL (ref 0.2–1.3)
BUN SERPL-MCNC: 7 MG/DL (ref 7–30)
CALCIUM SERPL-MCNC: 7.3 MG/DL (ref 8.5–10.1)
CHLORIDE SERPL-SCNC: 112 MMOL/L (ref 94–109)
CO2 SERPL-SCNC: 23 MMOL/L (ref 20–32)
CREAT SERPL-MCNC: 0.84 MG/DL (ref 0.66–1.25)
ERYTHROCYTE [DISTWIDTH] IN BLOOD BY AUTOMATED COUNT: 13.3 % (ref 10–15)
GFR SERPL CREATININE-BSD FRML MDRD: ABNORMAL ML/MIN/1.7M2
GLUCOSE BLDC GLUCOMTR-MCNC: 95 MG/DL (ref 70–99)
GLUCOSE SERPL-MCNC: 101 MG/DL (ref 70–99)
HCT VFR BLD AUTO: 35.5 % (ref 40–53)
HGB BLD-MCNC: 11.9 G/DL (ref 13.3–17.7)
MCH RBC QN AUTO: 30.8 PG (ref 26.5–33)
MCHC RBC AUTO-ENTMCNC: 33.5 G/DL (ref 31.5–36.5)
MCV RBC AUTO: 92 FL (ref 78–100)
PLATELET # BLD AUTO: 220 10E9/L (ref 150–450)
POTASSIUM SERPL-SCNC: 3.5 MMOL/L (ref 3.4–5.3)
PROT SERPL-MCNC: 5.5 G/DL (ref 6.8–8.8)
RBC # BLD AUTO: 3.86 10E12/L (ref 4.4–5.9)
SODIUM SERPL-SCNC: 143 MMOL/L (ref 133–144)
WBC # BLD AUTO: 6.2 10E9/L (ref 4–11)

## 2017-05-25 PROCEDURE — 99232 SBSQ HOSP IP/OBS MODERATE 35: CPT | Performed by: INTERNAL MEDICINE

## 2017-05-25 PROCEDURE — 40000914 ZZH STATISTIC SITTER, DAY HOURS

## 2017-05-25 PROCEDURE — 85027 COMPLETE CBC AUTOMATED: CPT | Performed by: INTERNAL MEDICINE

## 2017-05-25 PROCEDURE — 25000128 H RX IP 250 OP 636: Performed by: PSYCHIATRY & NEUROLOGY

## 2017-05-25 PROCEDURE — 36415 COLL VENOUS BLD VENIPUNCTURE: CPT | Performed by: INTERNAL MEDICINE

## 2017-05-25 PROCEDURE — 25000132 ZZH RX MED GY IP 250 OP 250 PS 637: Performed by: INTERNAL MEDICINE

## 2017-05-25 PROCEDURE — 12000000 ZZH R&B MED SURG/OB

## 2017-05-25 PROCEDURE — 25000125 ZZHC RX 250: Performed by: INTERNAL MEDICINE

## 2017-05-25 PROCEDURE — 40000915 ZZH STATISTIC SITTER, EVENING HOURS

## 2017-05-25 PROCEDURE — 00000146 ZZHCL STATISTIC GLUCOSE BY METER IP

## 2017-05-25 PROCEDURE — 99254 IP/OBS CNSLTJ NEW/EST MOD 60: CPT | Performed by: PSYCHIATRY & NEUROLOGY

## 2017-05-25 PROCEDURE — 40000916 ZZH STATISTIC SITTER, NIGHT HOURS

## 2017-05-25 PROCEDURE — 80076 HEPATIC FUNCTION PANEL: CPT | Performed by: INTERNAL MEDICINE

## 2017-05-25 PROCEDURE — 80048 BASIC METABOLIC PNL TOTAL CA: CPT | Performed by: INTERNAL MEDICINE

## 2017-05-25 PROCEDURE — 25000125 ZZHC RX 250

## 2017-05-25 RX ORDER — LORAZEPAM 1 MG/1
1 TABLET ORAL 3 TIMES DAILY
Status: DISCONTINUED | OUTPATIENT
Start: 2017-05-25 | End: 2017-05-26

## 2017-05-25 RX ADMIN — LORAZEPAM 1 MG: 2 INJECTION INTRAMUSCULAR; INTRAVENOUS at 06:10

## 2017-05-25 RX ADMIN — DEXTROSE AND SODIUM CHLORIDE: 5; 900 INJECTION, SOLUTION INTRAVENOUS at 03:35

## 2017-05-25 RX ADMIN — LORAZEPAM 1 MG: 2 INJECTION INTRAMUSCULAR; INTRAVENOUS at 00:18

## 2017-05-25 RX ADMIN — LIDOCAINE HYDROCHLORIDE 10 ML: 20 JELLY TOPICAL at 10:35

## 2017-05-25 RX ADMIN — LORAZEPAM 1 MG: 1 TABLET ORAL at 15:36

## 2017-05-25 RX ADMIN — DEXTROSE AND SODIUM CHLORIDE: 5; 900 INJECTION, SOLUTION INTRAVENOUS at 23:54

## 2017-05-25 RX ADMIN — DEXTROSE AND SODIUM CHLORIDE: 5; 900 INJECTION, SOLUTION INTRAVENOUS at 11:23

## 2017-05-25 RX ADMIN — LORAZEPAM 1 MG: 1 TABLET ORAL at 22:07

## 2017-05-25 ASSESSMENT — VISUAL ACUITY
OU: NORMAL ACUITY

## 2017-05-25 NOTE — CONSULTS
"DATE OF ADMISSION:  05/24/2017      DATE OF SERVICE:   05/25/2017.      REASON FOR CONSULTATION:  Drug overdose with Wellbutrin, Celexa.      REQUESTING PHYSICIAN:  Lilian Mukherjee MD.      IDENTIFYING DATA:  Malik Smiley is a 42-year-old man who reports he is  and has a 13-month-old son and a 16-year-old stepson.  He recently got a new job with [employer] that he started this past Monday, but presented to the ED here at [facility] on account of seizures.  Psychiatry was consulted to render an opinion on his medication overdose which presumably precipitated the seizures.  Information was gathered through direct patient contact as well as collateral information provided by the patient's wife, Selene, who was at his bedside with his permission.      CHIEF COMPLAINT:  \"I really can't tell you why I did what I did.\"      HISTORY OF PRESENT ILLNESS:  Malik Smiley reports a longstanding history of depression.  He states he has been depressed for as long as he can remember.  He states he has been on multiple antidepressant medications over the years, including Prozac, Zoloft, Celexa, Lexapro, Wellbutrin, and Effexor XR.  He had been employed by [employer] for about 9 years until he began to flounder on the job and eventually quit last July.  He reportedly was deeply depressed the time and reports that he was smoking marijuana on a daily basis up until 12/2016 when he decided that he needed to get his life back together.  He did seek an appointment with Dr. Rodgers at [facility] and was started on Effexor XR.  Unfortunately, he developed medication side effects and consequently discontinued the medication.  Prior to that, he had been on Celexa and Wellbutrin through his primary care physician.  He reports that he went back on citalopram and quit smoking pot.  He reportedly improved significantly and decided to get a job.  He did go through the interviewing process " and was unsuccessful at securing employment.  Prior to that he had been staying at home with their 13-month-old son and had formed a strong larry with his son.  On account of his new job, he was going to have to place his son in  and he and his wife had discussed all these plans ahead of time.  His wife reports that she was supposed to leave town at the weekend for a meeting on Monday but delayed the trip until Monday so she could help ease the transition back to work and his separation from his son.  The patient went to work on Monday and appeared to have struggled.  Tuesday was going to be the first time he will be doing a drop off of his son at .  Patient apparently sent text messages to his wife during the day that he was having bad headaches and was not having a good day at work.  She reports that he seemed not to want to talk about it and eventually did call her later that day indicating that things had improved.  She reportedly went to sleep and somewhere between 10:00 p.m. and 2:00 a.m. the patient sent her a few text messages telling her he was going to quit his job and was going to take all his medications and was saying goodbye to her.  She tried to reach him, but could not on account of which she called his mom, her mom, and 911.  EMS went to his home and he was brought to the ED here at [facility] where he vehemently denied taking any medications.  He reportedly underwent a thorough evaluation and was deemed stable enough to be discharged to the custody of his mother who was present with him at the emergency room.  He was thus returned home by his mom at about 7:00 a.m. in the morning at which point he went to sleep downstairs and the mother checked on him a couple of times and found him sleeping soundly.  Much later that morning, he reportedly called out to his mother that there were mice under his bed and she noticed that he was experiencing hallucinations.  She got him  back to bed and it was at this point that she witnessed him having a seizure episode.  She called 911 and he was picked up by EMS.  While in the ambulance, he had another witnessed seizure episode.  He has since been able to tell his wife that he ingested pills and believes that he took Wellbutrin.  However, his urine was also positive for amphetamines, which after much questioning he was able to suggest that he may have taken his stepson's Adderall.  His wife reports that she is unsure that he knows where the medication is kept because she is the only one that dispenses the medication to her son, but she cannot explain how he would have been positive for amphetamines.  He reports that he did not meet up with anyone and does not remember going to a bar to drink with anybody.  He did admit that he had several drinks on the day in question, but denies that he drinks to excess on a normal basis.  On direct questioning, the patient reports that he cannot recall exactly what got him so upset that he decided to end his life.  He admits that he had been experiencing self-harm thoughts in the past but had never acted out on his ideations.  He reports that he has never been psychiatrically hospitalized but appears to have gone through an outpatient program in the past.  He reports ruminative worry, particularly about his son and his wife.  He states he was somewhat anxious about resuming employment.  He denies that he had any contact with anyone at his new job that was stressful.  The patient reports he is glad to be alive and does not endorse any ongoing self-harm thoughts or plans.  He does not endorse any history suggestive of jocelyn or psychosis.  He denies history suggestive of panic attacks, obsessions, compulsions or symptoms suggestive of PTSD.      PAST PSYCHIATRIC HISTORY:  As described in History of Present Illness.      CHEMICAL USE HISTORY:  The patient reports he drinks alcohol socially.  His last use of  cannabis was about 2 weeks ago.      PAST MEDICAL HISTORY:  The patient reports being in good physical health and denies any chronic illnesses.      ALLERGIES:  No known drug allergies.      PAST SURGICAL HISTORY:  None reported.      MEDICATIONS PRIOR TO ADMISSION:   1.  Celexa 40 mg daily.   2.  Wellbutrin- mg daily.      FAMILY PSYCHIATRIC HISTORY:  The patient reports depression in his mother and brother.      SOCIAL HISTORY:  The patient reports he grew up in [place], [place].  He has a twin brother and an older sister as well as an older brother.  He reports graduating from high school, attended [employer] but did not graduate.  He reports he has worked in the [employer].  He reports that this is his first marriage and has a [id number] son.  He denies  or criminal history.      REVIEW OF SYSTEMS:  A 10-point review of systems was negative apart from the pertinent positives in the History of Present Illness.      PHYSICAL EXAMINATION:   VITAL SIGNS:  Blood pressure 114/77, pulse 136, respirations 18, temperature 98.7.       MENTAL STATUS EXAMINATION:  Malik Smiley is a middle-aged man who appears his stated age of 42.  He is dressed in hospital garb.  He makes fair eye contact.  He is unshaven.  He speaks softly but clearly.  His mood is depressed and his affect is flat and restricted in range.  His thought process is logical and relevant and devoid of a formal thought disorder.  He does not endorse any current self-harm thoughts, plans, or intent.  He denies the presence of auditory or visual hallucinations.  There are no delusions elicited.  He is alert and oriented to place and person, but not to time.  His gait and station were not assessed and he is currently bed bound.  His muscle strength is adequate, but he displays intermittent jerking movements of his lower extremities.  His speech is appropriate.  His associations are concrete.  His recall of recent events is poor, but  remote recall is adequate.  His attention span and concentration are limited.  Risk assessment at this time is considered high on account of recent overdose attempt.  Fund of knowledge is adequate.      DIAGNOSTIC IMPRESSION:  Rodolfo Mccabe is a 42-year-old , father of 1, who also has a stepson.  He has an established history of major depressive disorder and presents to the hospital with seizures in the context of medication overdose.  He recently started a new job and placed his 13-month-old son in  for the first time.  He is unable to identify specific stressors that led to his acute dysphoria and suicide attempt.  He is not expressing any further self-harm thoughts at this point, but he is quite unclear as to why he did what he did.  He is currently on a 72-hour hold.      DIAGNOSES:   1.  Major depressive disorder, recurrent, severe, without psychotic features.   2.  Adjustment disorder with mixed anxiety and depressed mood.   3.  Cannabis use disorder, uncomplicated.   4.  Rule out amphetamine intoxication.   5.  Seizures (medication induced).      RECOMMENDATIONS:   1.  Medical management as you are.   2.  Continue to hold antidepressant medications. The patient currently is denying self-harm thoughts, but he is unable to specifically report what led to his overdose attempt.  I recommend maintaining him on a 72-hour hold until further clarification of his situation can be made.  His wife is agreeable with this plan and this has been explained to him.  Psychiatry should be reconsulted for reassessment once he is deemed medically stable.      Thanks for the consult.         KIMMY VALDEZ MD             D: 2017 13:05   T: 2017 14:53   MT: [place]      Name:     RODOLFO MCCABE   MRN:      [id number]        Account:       [id number]   :      1975           Consult Date:  2017      Document: [id number]       cc: Delfina Rodgers MD

## 2017-05-25 NOTE — CONSULTS
Pt seen for initial psychiatric evaluation, please see my dictation for details and recommendations. Maintain 72-hour hold at this time. Continue to hold psych meds. We'll re-evaluate when medically cleared.

## 2017-05-25 NOTE — CONSULTS
"BRIEF NUTRITION ASSESSMENT      REASON FOR ASSESSMENT:  Nutrition Admission Risk Screen - unintentional weight loss of 10 lbs or more in the past 2 mos.     NUTRITION HISTORY:  Pt asleep during time of visit, covered with blankets.   Spoke to CNA at bedside - pt has a good appetite, however he must have bitten his tongue causing a stinging with PO.     CURRENT DIET AND INTAKE:  Diet:  Regular diet              Ordered Czech toast for a late breakfast - RN documentation shows 100%.     ANTHROPOMETRICS:  Height: 5' 10\"  Weight: None available  BMI:  Unable to evaluate  IBW:  75.5 kg  Weight Status: Unable to evaluate  %IBW: Unable to evaluate  Weight History:   Wt Readings from Last 10 Encounters:   No data found for Wt       LABS:  Labs noted    MALNUTRITION:  Unable to address    NUTRITION INTERVENTION:  Nutrition Diagnosis:  No nutrition diagnosis at this time.    Implementation:  Nutrition Education:  not appropriate at this time due to patient condition  Weigh patient for ability to better assess nutrition status    FOLLOW UP/MONITORING:   Will re-evaluate in 7 - 10 days, or sooner, if re-consulted.    Angelica Bejarano, RD, LD      "

## 2017-05-25 NOTE — PLAN OF CARE
Problem: Goal Outcome Summary  Goal: Goal Outcome Summary  Outcome: No Change  A&O x4. Able to follow simple commands, restless at times. Has BUE tremors and generalized weakness, other neuros intact. VSS. Tele NSR. NPO except ice chips, voiding in urinal. Denies pain. On bedrest, able to turn self in bed. Seizure precautions maintained. Will continue to monitor.

## 2017-05-25 NOTE — PROGRESS NOTES
[facility]    Hospitalist Progress Note      Assessment & Plan   Mr. Malik Smiley is a pleasant 42-year-old gentleman with past medical history of anxiety and depression who was brought in for evaluation of drug overdose and seizures.    1.  Intentional drug overdose/Serotonin syndrome  - apparently overdosed with Wellbutrin (maybe Celexa also) although the timing and amount of medication is not known.    - this was his second ER visit in the last 24 hours;   - He was seen initially early morning and at that time his vitals were stable, physical exam was unremarkable and at that time he denied taking any medications  - then he presented with confusion, hallucinations, increased muscle tone/clonus and hyperreflexia and 3  withnessed 3 seizures  - Poison Control was called and supportive management with iv fluids and benzo were recommended  - Neuro consult appreciated  - admitted initially to ICU, started on scheduled Ativan 1 mg iv q6h, now can start taper, change to Ativan po 1mg q8h  - tele  - doing better today, no more seizures overnight, no more clonus, less confused, remembers that he took Wellbutrin, maybe 20 pills, not back to baseline yet  - decrease iv fluids  - Psych consult     2.  Seizures  - He does not have a known history of seizures, but this is likely due to overdosing on Wellbutrin.  - started on scheduled IV lorazepam as per Neurology  - as per Neuro- no need to load with with antiepileptic medication at the time  - will start taper- change to Ativan po q8h  - continue neurochecks q2h; seizure precautions    3.  Anxiety, depression with suicidal attempt with intentional overdose  - hold PTA Celexa and Wellbutrin  - on a 72-hour hold.   -  Psychiatry consult    4. Urinary retention   - void 150cc and post void scan 450 cc; likely due to overdose side effects  - straight cath now    DVT Prophylaxis: Pneumatic Compression Devices  Code Status: Full Code    Disposition: Expected  discharge in 1-2 days, when cleared by Psych    Lilian Mukherjee    Interval History   Doing better, oriented to self, place and time; not back to baseline yet, tremulous; no N/V, no headache, no chest pain, no SOB; reports urinary frequency but voiding small amount of urine; discussed with wife at bedside, RN and Neurology    -Data reviewed today: I reviewed all new labs and imaging results over the last 24 hours. I personally reviewed no images or EKG's today.    Physical Exam   Temp: 98.2  F (36.8  C) Temp src: Oral BP: 112/75 Pulse: 136 Heart Rate: 88 Resp: 18 SpO2: 96 % O2 Device: None (Room air) Oxygen Delivery: 2 LPM  There were no vitals filed for this visit.  Vital Signs with Ranges  Temp:  [97.8  F (36.6  C)-98.8  F (37.1  C)] 98.2  F (36.8  C)  Pulse:  [136] 136  Heart Rate:  [] 88  Resp:  [8-25] 18  BP: ()/(46-95) 112/75  SpO2:  [92 %-100 %] 96 %  I/O last 3 completed shifts:  In: 2581.25 [I.V.:1581.25; IV Piggyback:1000]  Out: 1600 [Urine:1600]    Constitutional: Awake, alert, NAD, mildly confused  Respiratory: B/l air entry, no wheezing, no crackles  Cardiovascular: S1S2, RRR, no murmurs  GI: abdomen- soft, nonT, nonD, BS present  Skin/Integumen: intact, no rashes, no cyanosis  Extremities- no leg swellings  Neuro  - AAOX3, no FNDs    Medications     dextrose 5% and 0.9% NaCl 125 mL/hr at 05/25/17 0335       LORazepam  1 mg Oral TID     sodium chloride (PF)  3 mL Intracatheter Q8H       Data     Recent Labs  Lab 05/25/17  0435 05/24/17  1132 05/24/17  0324   WBC 6.2 8.7  --    HGB 11.9* 14.6  --    MCV 92 91  --     322  --     142 142   POTASSIUM 3.5 3.7 3.9   CHLORIDE 112* 108 108   CO2 23 18* 23   BUN 7 13 12   CR 0.84 1.08 0.95   ANIONGAP 8 16* 11   MARIANA 7.3* 8.4* 8.3*   * 123* 97   ALBUMIN 3.0* 3.9  --    PROTTOTAL 5.5* 7.1  --    BILITOTAL 0.4 0.3  --    ALKPHOS 58 74  --    ALT 28 35  --    AST 18 18  --        Recent Results (from the past 24 hour(s))   CT Head  w/o Contrast    Narrative    CT OF THE HEAD WITHOUT CONTRAST 5/24/2017 12:15 PM     COMPARISON: None.    HISTORY: Seizure.    TECHNIQUE: Axial CT images of the head from the skull base to the  vertex were acquired without IV contrast.    FINDINGS: The ventricles and basal cisterns are within normal limits  in configuration. There is no midline shift. There are no extra-axial  fluid collections. Gray-white differentiation is well maintained.    No intracranial hemorrhage, mass or recent infarct.    The visualized paranasal sinuses are well-aerated. There is no  mastoiditis. There are no fractures of the visualized bones.      Impression    IMPRESSION: Normal head CT.      Radiation dose for this scan was reduced using automated exposure  control, adjustment of the mA and/or kV according to patient size, or  iterative reconstruction technique.    PENNY ROTHMAN MD

## 2017-05-25 NOTE — PROGRESS NOTES
"[facility]  Neuroscience and Sp[facility] [place]  Neurology Daily Note     10/11/11 8:35 AM        Assessment and Plan: 1.   Seizures- from Wellbutrin OD. No more noted. On ativan- can taper gradually  2. Would not start anticonvulsant as seizures likely situational  3. Psych to see  4. Please call if needed further    Attestation:  This patient was seen and evaluated by me, Naeem Sotelo, separately from the house staff team or resident(s).  I have reviewed the note below and agree.          Interval History:   No more seizures           Review of Systems:   Sore tongue          Medications:     Current Facility-Administered Medications   Medication     naloxone (NARCAN) injection 0.1-0.4 mg     lidocaine 1 % 1 mL     lidocaine (LMX4) cream     sodium chloride (PF) 0.9% PF flush 3 mL     sodium chloride (PF) 0.9% PF flush 3 mL     potassium chloride SA (K-DUR/KLOR-CON M) CR tablet 20-40 mEq     potassium chloride (KLOR-CON) Packet 20-40 mEq     potassium chloride 10 mEq in 100 mL intermittent infusion     potassium chloride 10 mEq in 100 mL intermittent infusion with 10 mg lidocaine     potassium chloride 20 mEq in 50 mL intermittent infusion     acetaminophen (TYLENOL) tablet 650 mg     acetaminophen (TYLENOL) Suppository 650 mg     ondansetron (ZOFRAN-ODT) ODT tab 4 mg    Or     ondansetron (ZOFRAN) injection 4 mg     senna-docusate (SENOKOT-S;PERICOLACE) 8.6-50 MG per tablet 1-2 tablet     LORazepam (ATIVAN) injection 2 mg     LORazepam (ATIVAN) injection 1 mg     dextrose 5% and 0.9% NaCl infusion             Physical Exam:   Vitals: Blood pressure 112/75, pulse 136, temperature 98.2  F (36.8  C), temperature source Oral, resp. rate 18, height 1.778 m (5' 10\"), SpO2 96 %.  Alert and oriented. EOM full. Moves all extremities well. Bruising on tongue          Data:   ROUTINE IP LABS (Last 3results)  CBC RESULTS:     Recent Labs  Lab 05/25/17  0435 05/24/17  1132   WBC 6.2 8.7   RBC 3.86* 4.81   HGB " 11.9* 14.6   HCT 35.5* 43.8    322     Basic Metabolic Panel:    Recent Labs  Lab 05/25/17  0435 05/24/17  1132 05/24/17  0324    142 142   POTASSIUM 3.5 3.7 3.9   CHLORIDE 112* 108 108   CO2 23 18* 23   BUN 7 13 12   CR 0.84 1.08 0.95   * 123* 97   MARIANA 7.3* 8.4* 8.3*     INR:No lab results found in last 7 days.   Lipid Profile:No results for input(s): CHOL, HDL, LDL, TRIG, CHOLHDLRATIO in the last 43094 hours.  TSH:  TSH   Date Value Ref Range Status   05/24/2017 3.39 0.40 - 4.00 mU/L Final   ,   .

## 2017-05-25 NOTE — PROGRESS NOTES
Pt AxO, VSS, Tele SR/ST. KITA clear. Voiding via urinal. Report called to station 73 nurse. Pt taken by wheelchair by flying squad with sitter.

## 2017-05-25 NOTE — PROGRESS NOTES
HOSPITALIST SERVICE CROSS-COVER NOTE:    Transfer requested by ICU staff to accommodate new admission to ICU. Transfer order placed for med tele.      Tania Brady MD  Hospitalist  Pager # [phone]

## 2017-05-25 NOTE — PLAN OF CARE
Problem: Goal Outcome Summary  Goal: Goal Outcome Summary  Outcome: Improving  Neuro exam intact except for BUE/BLE tremors.  Upon waking this AM patient states he could not remember the last 24 hours and did not remember coming to the hospital, A&O rest of shift.  Up with assist of one and gait belt, unsteady gait when ambulating.  Voiding frequently with retention, straight cath x 1 for 675cc.  Encouraged frequent voiding with PVR's.  Plan to continue q2h neuro checks and 72 hold, sitter at bedside.

## 2017-05-25 NOTE — H&P
"DATE OF ADMISSION:  05/24/2017.       PRIMARY CARE PHYSICIAN:  Teo Morales MD.      PRIMARY PSYCHIATRIST:  Dr. Camarillo.      CHIEF COMPLAINT:  Drug overdose and seizures.      HISTORY OF PRESENT ILLNESS:  Had been somehow limited due to the patient's altered mental status.  I did discuss at length with the patient's wife and his mother and discussed with the ER attending.  Mr. Malik Smiley is a pleasant 42-year-old gentleman with past medical history of anxiety and depression who was brought in for evaluation of drug overdose and seizures.  Actually this is his second ER visit in the last 24 hours.      The patient does have a history of anxiety and depression and he is being followed by .  At home he has been maintained on Wellbutrin 300 mg p.o. daily and Celexa 40 mg p.o. daily.  The wife reports that his medication doses have been changed sometime in the wintertime, not recently.  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  He never expressed suicidal thoughts or ideation before and he never had suicidal attempts before.  His wife went to [place] yesterday morning for a meeting.  She did speak with her  in the afternoon and he seemed to be doing fine at that time.  Then she went to sleep and apparently between at 10:00 p.m. and 2:00 a.m. during the night, her  sent her a few text messages telling her that he was going to take all his medications and saying \"goodbye\" to her.  She did alert 911 who arrived at his place early morning and brought him to [facility] around 4:00 a.m.  He did have some basic blood work done which was unremarkable.  His physical exam was also unremarkable.  He did not have any evidence of serotonin syndrome.  He denied taking any medications at home and " although he did report that he had some thoughts earlier in the night about overdosing, he did not do this.  He was evaluated by the and it was felt that the patient could be discharged home with somebody monitoring him.  His mother came to [employer] and picked him up and drove him back to his house.  They went back home around 6:00 a.m., apparently he went downstairs and he went to sleep.  His mom reports that she checked on him a couple of times and he was sleeping comfortably, but then around 10:00 a.m. she heard some noises coming from his bedroom.  She went to check on him and it looks like he was hallucinating telling her that there were some mice under the bed.  His mom witnessed him having a tonic-clonic seizure at home that apparently lasted about 30 seconds.  She called 911 and patient had been brought to [employer].  As per report, on his way to the ER, he had another witnessed seizure.  He does not have a known history of seizures.  Although upon initial ER visit, he denied taking any pills, it is not clear if he took any medications after he went back home.      In the ER, the patient was seen by Dr. Hood.  He had repeat BMP which this time showed low bicarbonate level of 18 with mild increased anion gap of 16.  LFTs were within normal limits.  Glucose of 78.  CBC again with no abnormalities.  Acetaminophen and salicylate level were negative.  He tested positive for amphetamine.  Ethanol level was negative.  CT of the head without contrast was negative for any acute intracranial pathology and EKG showed sinus tachycardia at the rate of 130 beats per minute with some ST-T wave abnormalities.  Poison Control was called from ER.  He had evidence of serotonin syndrome with tachycardia, hyperreflexia, clonus.  Poison Control recommended supportive management with IV fluids and benzodiazepine.  The plan was for him to be placed on a 72-hour hold and admitted to ICU for close monitoring.  On his way to  the ICU he had a third seizure which was witnessed that lasted about 30 seconds.  In the ER he was given 3 doses of Ativan 1 mg IV x3, a few boluses of normal saline, 1 dose of magnesium sulfate 2 grams IV.  He was kept in the ER for further monitoring and after 30 minutes it was deemed that the patient is stable to be transferred to ICU.      I had seen the patient in the ER.  His wife and mother were present at the time of my examination.  The patient was confused to situation and at that time.  He says that he took Wellbutrin and Celexa, although, could not tell me how many pills of each.  He did report having some mild headache, but he denied having any chest pain or shortness of breath, no nausea, no vomiting, no abdominal pain.  He seemed to be hallucinating while he was in the ER.  Neurology was also contacted by ER attending, given his recurrent seizures.      The patient's wife reported that she thinks that he smoked some marijuana a couple of weeks ago.  She also thinks that he drank a couple of drinks last night.  When I told her that urine toxicology is positive for amphetamine, she was worried that while she was out of the city yesterday he might have met an old friend who may have provided him with illegal drugs.      PAST MEDICAL HISTORY:  Anxiety/depression.      PAST SURGICAL HISTORY:  None.      FAMILY HISTORY:  Reviewed with the patient the wife.  It seems that his father had atrial fibrillation.  His mother had depression.  He has 2 brothers and 1 sister, 1 brother has a problem of drug addiction, another brother has depression, otherwise no significant medical problems.      MEDICATIONS PRIOR TO ADMISSION:   1.  Wellbutrin  mg p.o. q. day.   2.  Celexa 40 mg p.o. daily.      SOCIAL HISTORY:  The patient used to smoke marijuana, but he stopped doing it a few months ago, although he smoked again, 2 weeks ago.  He apparently drinks alcohol socially but wife thing that he drank a couple of  drinks last night and also when he drinks he tends to binge drink.      REVIEW OF SYSTEMS:  A 10-point review of systems was conducted and it was negative except for pertinent positives mentioned in history of present illness.      PHYSICAL EXAMINATION:   VITAL SIGNS:  Blood pressure is 114/80, heart rate 118, oxygen saturation 99% on 2 liters via nasal cannula, respiratory rate 16, temperature 98.8.   GENERAL:  The patient is awake, alert, confused, mildly hallucinating, no acute distress at the time of my examination.   HEENT:  Normocephalic, atraumatic.  Pupils are equally round and reactive to light.  Oral mucosa is moist.  There is evidence of tongue biting.   NECK:  Supple.  No cervical lymphadenopathy, no thyromegaly.   CHEST:  There is bilateral air entry, no wheezing, no rales, no crackles.   CARDIOVASCULAR:  There is normal S1 and S2, tachycardia, no murmurs, no rubs.   ABDOMEN:  Soft, nontender, nondistended.  Bowel sounds are present.   EXTREMITIES:  There is no leg swelling, no calf tenderness, 2+ peripheral pulses are palpable.   SKIN:  Intact, no rashes, no cyanosis.   NEUROLOGIC:  The patient is awake, alert, confused to station and place.  He does seems to have some muscle rigidity.  He also has hyperreflexia and clonus in the lower extremities.  Motor strength seems to be symmetric in both upper and lower extremities.  Sensory is grossly intact.   PSYCHIATRIC:  He is hallucinating.   MUSCULOSKELETAL:  He moves all extremities freely.  There are no obvious joint deformities.      LABORATORY DATA:  Sodium 142, potassium 3.7, chloride 108, bicarbonate 18, BUN 13, creatinine 1.08, calcium 8.4, anion gap 16.  Magnesium 2.5, albumin 3.9, total protein 7.1, total bilirubin 0.3, alkaline phosphatase 74, ALT 35, AST 18.  TSH 3.39.  Glucose 78.  White blood cells 8.7, hemoglobin 14.6, hematocrit 43.8, platelet count 322.  Tylenol and salicylate level less than 2.  Urine toxicology positive for amphetamines.   Ethanol level less than 0.02.      IMAGING:  CT of the head:  No acute intracranial pathology.      ASSESSMENT:  Mr. Malik Smiley is a pleasant 42-year-old gentleman with past medical history of depression and anxiety who was admitted for evaluation of drug overdose and recurrent seizures.      PLAN:   1.  Intentional drug overdose apparently with Wellbutrin and Celexa, although the timing and amount of medication is not known.  This was his second ER visit in the last 24 hours.  He was seen initially early morning and at that time his vitals were stable.  Physical exam was unremarkable and at that time he denied taking any medications so the ingestion might have occurred after he went back home.  He had 3 seizures which were witnessed.  He also presents with confusion, hallucinations, increased muscle tone/clonus and hyperreflexia.  Poison Control was called and supportive management was recommended at that time with IV fluids and benzodiazepine.  Neurology was consulted.  They already saw the patient and they recommended to start him on scheduled IV lorazepam 1 mg every 6 hours as well as IV fluids, antiemetics, close monitoring in the ICU telemetry.   2.  Seizures:  He does not have a known history of seizures, but this is likely due to overdosing on Wellbutrin.  As mentioned above, he will be started on scheduled IV lorazepam as per Neurology.  No need to load with with antiepileptic medication at the time, just continue with supportive care and seizure precautions and Ativan p.r.n.  As per Neurology, if he has another seizure that does not respond to lorazepam, the patient may need intubation and sedation with midazolam for the next 24 hours.   3.  History of anxiety, depression with suicidal attempt with intentional overdose:  Plan is to hold his Celexa and Wellbutrin.  He is on a 72-hour hold.  Psychiatry consult was requested for the morning.   4.  Deep venous thrombosis prophylaxis:  Pneumatic compression  devices.   5.  CODE STATUS:  Discussed with the patient's wife and patient is full code.      DISPOSITION:  The patient is admitted under inpatient status, as I expect at least 2 days of hospitalization.         MILI FORTUNE MD             D: 2017 20:02   T: 2017 21:53   MT:       Name:     RODOLFO MCCABE   MRN:      [id number]        Account:      [id number]   :      1975           Admitted:     [id number]      Document: [id number]       cc: Teo Morales MD

## 2017-05-26 VITALS
WEIGHT: 182.1 LBS | RESPIRATION RATE: 16 BRPM | TEMPERATURE: 98.5 F | SYSTOLIC BLOOD PRESSURE: 116 MMHG | HEIGHT: 70 IN | DIASTOLIC BLOOD PRESSURE: 68 MMHG | BODY MASS INDEX: 26.07 KG/M2 | OXYGEN SATURATION: 94 % | HEART RATE: 136 BPM

## 2017-05-26 PROCEDURE — 40000914 ZZH STATISTIC SITTER, DAY HOURS

## 2017-05-26 PROCEDURE — 25000132 ZZH RX MED GY IP 250 OP 250 PS 637: Performed by: PSYCHIATRY & NEUROLOGY

## 2017-05-26 PROCEDURE — 99238 HOSP IP/OBS DSCHRG MGMT 30/<: CPT | Performed by: INTERNAL MEDICINE

## 2017-05-26 PROCEDURE — 25000132 ZZH RX MED GY IP 250 OP 250 PS 637: Performed by: INTERNAL MEDICINE

## 2017-05-26 RX ORDER — CITALOPRAM HYDROBROMIDE 20 MG/1
40 TABLET ORAL DAILY
Qty: 60 TABLET | Refills: 0 | Status: SHIPPED | OUTPATIENT
Start: 2017-05-26 | End: 2017-05-26

## 2017-05-26 RX ORDER — CITALOPRAM HYDROBROMIDE 20 MG/1
20 TABLET ORAL DAILY
Qty: 60 TABLET | Refills: 0 | Status: SHIPPED | OUTPATIENT
Start: 2017-05-26

## 2017-05-26 RX ORDER — CITALOPRAM HYDROBROMIDE 20 MG/1
20 TABLET ORAL DAILY
Status: DISCONTINUED | OUTPATIENT
Start: 2017-05-27 | End: 2017-05-26 | Stop reason: HOSPADM

## 2017-05-26 RX ORDER — CITALOPRAM HYDROBROMIDE 10 MG/1
10 TABLET ORAL DAILY
Status: COMPLETED | OUTPATIENT
Start: 2017-05-26 | End: 2017-05-26

## 2017-05-26 RX ORDER — LORAZEPAM 1 MG/1
1 TABLET ORAL 2 TIMES DAILY
Status: DISCONTINUED | OUTPATIENT
Start: 2017-05-26 | End: 2017-05-26 | Stop reason: HOSPADM

## 2017-05-26 RX ADMIN — LORAZEPAM 1 MG: 1 TABLET ORAL at 08:21

## 2017-05-26 RX ADMIN — CITALOPRAM HYDROBROMIDE 10 MG: 10 TABLET ORAL at 13:17

## 2017-05-26 ASSESSMENT — VISUAL ACUITY
OU: NORMAL ACUITY

## 2017-05-26 NOTE — PROVIDER NOTIFICATION
"Hospitalist paged regarding discharge, \"Neuro signed off, psych lifted the 72 hour hold, patient wants to discharge today, is this possible? Thank you!\"  "

## 2017-05-26 NOTE — PLAN OF CARE
Problem: Goal Outcome Summary  Goal: Goal Outcome Summary  Outcome: Improving  AxOx4 but still having a little difficulty remembering details of the event that brought him to the hospital. Neuros intact except bilateral upper extremity tremors. VSS. Tele NSR. Regular diet. Up with one assist and gait belt. Generalized weakness. Some post residual urinary retention but bladder scans within parameters without need to straight cath. Denies pain. Plan pending at this time.

## 2017-05-26 NOTE — PROGRESS NOTES
[facility]    Hospitalist Progress Note      Assessment & Plan   Mr. Malik Smiley is a pleasant 42-year-old gentleman with past medical history of anxiety and depression who was brought in for evaluation of drug overdose and seizures.    1.  Intentional drug overdose/Serotonin syndrome  - apparently overdosed with Wellbutrin (maybe Celexa also) although the timing and amount of medication is not known.   - U tox positive for amphetamine but he denies use of amphetamines  - this was his second ER visit; He was seen initially early morning on 5/24 and at that time his vitals were stable, physical exam was unremarkable and at that time he denied taking any medications; he was d/c home from ER  - later in the day 5/24- he presented with confusion, hallucinations, increased muscle tone/clonus and hyperreflexia and 3 withnessed 3 seizures  - Poison Control was called and supportive management with iv fluids and benzo were recommended  - Neuro consult appreciated  - admitted initially to ICU, started on scheduled Ativan 1 mg iv q6h,   - doing better, was transferred out of ICU on 5/25  - as per Neuro- can taper Ativan- changed to 1mg q8h- now to 1mg q12h; stop iv fluids  - tele- NSR  - feeling better today, confusion clears up although he does not remember the timing of the events the night before and after admission    - no more seizures   - Psych consulted yesterday- plan to see the patient again today- possible transfer to Psych floor?     2.  Seizures  - He does not have a known history of seizures, but this is likely due to overdosing on Wellbutrin.  - started on scheduled IV lorazepam as per Neurology  - as per Neuro- no need to load with with antiepileptic medication at the time  - started taper- changed iv Ativan to Ativan po q8h and now q12h  - seizure precautions    3.  Anxiety, depression with suicidal attempt with intentional overdose  - hold PTA Celexa and Wellbutrin  - on a 72-hour hold.   -  Psychiatry to see the patient again today    4. Urinary retention-resolved  - void 150cc and post void scan 450 cc; likely due to overdose side effects  - straight cath X1 yesterday  - now able to void, PVR 90cc    DVT Prophylaxis: Pneumatic Compression Devices  Code Status: Full Code    Disposition: Needs to be transferred to Psych prado?    Lilian Mukherjee    Interval History   Doing better, oriented to self, place and time, but does not remember exactly the timing of the events; reports some tongue soreness; no N/V, no headache, no chest pain, no SOB; discussed with wife at bedside, RN    -Data reviewed today: I reviewed all new labs and imaging results over the last 24 hours. I personally reviewed no images or EKG's today.    Physical Exam   Temp: 98.6  F (37  C) Temp src: Oral BP: 110/81   Heart Rate: 76 Resp: 16 SpO2: 96 % O2 Device: None (Room air)    Vitals:    05/26/17 0500   Weight: 82.6 kg (182 lb 1.6 oz)     Vital Signs with Ranges  Temp:  [97.8  F (36.6  C)-98.7  F (37.1  C)] 98.6  F (37  C)  Heart Rate:  [74-93] 76  Resp:  [16-18] 16  BP: (110-133)/(77-83) 110/81  SpO2:  [95 %-97 %] 96 %  I/O last 3 completed shifts:  In: 2750 [P.O.:790; I.V.:1960]  Out: 3530 [Urine:3530]    Constitutional: Awake, alert, NAD  HEENT- normocephalic, atraumatic, PERRL, tongue with bite markings on sides  Respiratory: B/l air entry, no wheezing, no crackles  Cardiovascular: S1S2, RRR, no murmurs  GI: abdomen- soft, nonT, nonD, BS present  Skin/Integumen: intact, no rashes, no cyanosis  Extremities- no leg swellings  Neuro  - AAOX3, no FNDs    Medications     dextrose 5% and 0.9% NaCl 75 mL/hr at 05/25/17 2354       LORazepam  1 mg Oral TID     sodium chloride (PF)  3 mL Intracatheter Q8H       Data     Recent Labs  Lab 05/25/17  0435 05/24/17  1132 05/24/17  0324   WBC 6.2 8.7  --    HGB 11.9* 14.6  --    MCV 92 91  --     322  --     142 142   POTASSIUM 3.5 3.7 3.9   CHLORIDE 112* 108 108   CO2 23 18* 23    BUN 7 13 12   CR 0.84 1.08 0.95   ANIONGAP 8 16* 11   MARIANA 7.3* 8.4* 8.3*   * 123* 97   ALBUMIN 3.0* 3.9  --    PROTTOTAL 5.5* 7.1  --    BILITOTAL 0.4 0.3  --    ALKPHOS 58 74  --    ALT 28 35  --    AST 18 18  --        No results found for this or any previous visit (from the past 24 hour(s)).

## 2017-05-26 NOTE — PLAN OF CARE
Problem: Goal Outcome Summary  Goal: Goal Outcome Summary  Outcome: Improving  A&O x4, anxious at times. Has BUE tremors and generalized weakness, other neuros intact. VSS. Tele NSR. Regular diet. Up with 1 assist and GB. Voiding in urinal, PVR 98ml. Denies pain. DC plan pending, will continue to monitor.

## 2017-05-26 NOTE — DISCHARGE SUMMARY
[facility]    Discharge Summary  Hospitalist    Date of Admission:  5/24/2017  Date of Discharge:  5/26/2017  4:15 PM  Discharging Provider: Lilian Mukherjee  Date of Service (when I saw the patient): 05/26/17    Discharge Diagnoses    Metabolic Encephalopathy  Drug Overdose, intentional  Serotonin Syndrome  Seizures  Depression/Anxiety with suicidal attempt    History of Present Illness      Mr. Malik Smiley is a pleasant 42-year-old gentleman with past medical history of anxiety and depression who was brought in for evaluation of drug overdose and seizures; for a detailed HPI- please refer to H&P done by myself on 05/24/2017.    Hospital Course   Malik Smiley was admitted on 5/24/2017.  The following problems were addressed during his hospitalization:    1.  Metabolic Encephalopathy due to Intentional drug overdose      Serotonin syndrome  - apparently overdosed with Wellbutrin (maybe Celexa also) although the timing and amount of medication is not known.   - U tox positive for amphetamine but he denies use of amphetamines; the wife states that her son's Adderall pills were intact at home  - this was his second ER visit; He was seen initially early morning on 5/24 and at that time his vitals were stable, physical exam was unremarkable and at that time he denied taking any medications; he was d/c home from ER  - later in the day 5/24- he presented with confusion, hallucinations, increased muscle tone/clonus and hyperreflexia and 3 withnessed seizures  - Poison Control was called and supportive management with iv fluids and benzo were recommended  - Neuro consult appreciated  - admitted initially to ICU, started on scheduled Ativan 1 mg iv q6h, iv fluids, Telemetry monitoring  - doing better, was transferred out of ICU on 5/25  - as per Neuro- can taper Ativan- changed to 1mg q8h, then 12h;  - tele- NSR  - no more seizure, confusion clearing up although he does not remember the timing of the  events the night before and after admission    - Psych consulted and felt that patient is stable to be discharged home on a lower dose of Celexa 20 mg po daily and follow up with his psychiatrist      2.  Seizures  - He does not have a known history of seizures, but this is likely due to overdosing on Wellbutrin.  - put on seizure precautions  - started on scheduled IV lorazepam as per Neurology, then tapered  - as per Neuro- no need to load with with antiepileptic medication at the time     3.  Anxiety, depression with suicidal attempt with intentional overdose  - PTA on Wellbutrin  mg po daily and Celexa 40 mg po daily- both on hold initially   - was on a 72-hour hold.   - Psychiatry saw the patient who discontinued 72 h hold  - pt denied having any self harm thoughts anymore  - Psychiatry recommended to resume Celexa at a lower dose 20 mg po daily and follow up with his psychiatrist as outpatient  - discussed with the wife and she felt comfortable with him going home today.     4. Urinary retention-resolved  - void 150cc and post void scan 450 cc; likely due to overdose side effects  - needed straight cath X1 but then he was able to void without any interventions.    Lilian Mukherjee    Significant Results and Procedures   See below    Pending Results   None  Unresulted Labs Ordered in the Past 30 Days of this Admission     No orders found from 3/25/2017 to 5/25/2017.          Code Status   Full Code       Primary Care Physician   Teo Morales      Discharge Disposition   Discharged to home  Condition at discharge: Stable    Consultations This Hospital Stay   PSYCHIATRY IP CONSULT  NEUROLOGY IP CONSULT  CHEMICAL DEPENDENCY IP CONSULT  PSYCHIATRY IP CONSULT    Time Spent on this Encounter   I, Lilian Mukherjee, personally saw the patient today and spent less than or equal to 30 minutes discharging this patient.    Discharge Orders     Reason for your hospital stay   You have been admitted for  evaluation after drug overdose with Wellbutrin you were seen by Neurology and monitored in ICU initially; your condition has improved; you were seen by Psychiatry who recommended you to stop Wellbutrin and continue with Celexa at a lower dose 20 mg po daily and to follow up with your psychiatrist soon.     Follow-up and recommended labs and tests    Follow up with primary care provider, Teo Morales, within 7 days for hospital follow- up.  No follow up labs or test are needed.    Follow up with primary Psychiatrist in 1-2 weeks.     Activity   Your activity upon discharge: activity as tolerated and no driving for 2 days or until mentation clears completely.     When to contact your care team   Call your primary doctor or return to ER if you have any of the following: temperature greater than 100.5 or less than 95, worsening depression, suicidal ideation, agitation, confusion, hallucinations, dizziness, loss of consciousness, seizures, nausea, vomiting..     Full Code     Diet   Follow this diet upon discharge: Orders Placed This Encounter     Room Service     Advance Diet as Tolerated: Regular Diet Adult       Discharge Medications   Current Discharge Medication List      CONTINUE these medications which have CHANGED    Details   citalopram (CELEXA) 20 MG tablet Take 1 tablet (20 mg) by mouth daily  Qty: 60 tablet, Refills: 0    Associated Diagnoses: Severe episode of recurrent major depressive disorder, without psychotic features (H)         STOP taking these medications       BuPROPion HCl (WELLBUTRIN XL PO) Comments:   Reason for Stopping:             Allergies   No Known Allergies  Data   Most Recent 3 CBC's:  Recent Labs   Lab Test  05/25/17   0435  05/24/17   1132   WBC  6.2  8.7   HGB  11.9*  14.6   MCV  92  91   PLT  220  322      Most Recent 3 BMP's:  Recent Labs   Lab Test  05/25/17   0435  05/24/17   1132  05/24/17   0324   NA  143  142  142   POTASSIUM  3.5  3.7  3.9   CHLORIDE  112*  108  108    CO2  23  18*  23   BUN  7  13  12   CR  0.84  1.08  0.95   ANIONGAP  8  16*  11   MARIANA  7.3*  8.4*  8.3*   GLC  101*  123*  97     Most Recent 2 LFT's:  Recent Labs   Lab Test  05/25/17   0435  05/24/17   1132   AST  18  18   ALT  28  35   ALKPHOS  58  74   BILITOTAL  0.4  0.3     Most Recent INR's and Anticoagulation Dosing History:  Anticoagulation Dose History     There is no flowsheet data to display.        Most Recent 3 Troponin's:No lab results found.  Most Recent Cholesterol Panel:No lab results found.  Most Recent 6 Bacteria Isolates From Any Culture (See EPIC Reports for Culture Details):No lab results found.  Most Recent TSH, T4 and A1c Labs:  Recent Labs   Lab Test  05/24/17   1132   TSH  3.39     Results for orders placed or performed during the hospital encounter of 05/24/17   CT Head w/o Contrast    Narrative    CT OF THE HEAD WITHOUT CONTRAST 5/24/2017 12:15 PM     COMPARISON: None.    HISTORY: Seizure.    TECHNIQUE: Axial CT images of the head from the skull base to the  vertex were acquired without IV contrast.    FINDINGS: The ventricles and basal cisterns are within normal limits  in configuration. There is no midline shift. There are no extra-axial  fluid collections. Gray-white differentiation is well maintained.    No intracranial hemorrhage, mass or recent infarct.    The visualized paranasal sinuses are well-aerated. There is no  mastoiditis. There are no fractures of the visualized bones.      Impression    IMPRESSION: Normal head CT.      Radiation dose for this scan was reduced using automated exposure  control, adjustment of the mA and/or kV according to patient size, or  iterative reconstruction technique.    PENNY ROTHMAN MD

## 2017-05-26 NOTE — PROGRESS NOTES
"Care Coordinator (CC) arranged for F/U appts for pt as follows which was entered in AVS.  CC called Dr Rodgers's (Psychiatrist) office and left message in voicemail requesting for Dr Rodgers to see pt sooner than  His July appt.  The following was entered in pt's AVS:    Follow up with primary care provider, Teo Morales, 8am on Friday June 2nd, (within 7 days for hospital follow- up).  No follow up labs or test are needed.     Follow up with primary Psychiatrist Dr Rodgers on Tuesday May 30st at 9:20 am then on July 10th.  Please call the clinic [phone] if you have questions about your appointment.     CC met wt pt and wife to inform above appts.  Pt is appreciative of appts arranged for him.  Pt states he feels safe at home and \" have the best wife.\"  Pt and wife state they have no other concerns at DC.  Pt to DC home wt wife today.   "

## 2017-05-26 NOTE — PLAN OF CARE
Problem: Goal Outcome Summary  Goal: Goal Outcome Summary  Outcome: Improving  AxOx4 with some difficulty recalling details from the event in which brought him to the hospital. Neuros intact except bilateral upper extremity tremors. Tele NSR. Regular diet. Up with SBA. Denies pain. Discharging this afternoon.

## 2017-05-26 NOTE — CONSULTS
[facility] Psychiatric Consult Progress Note      Interval History:   Pt seen, care reviewed with treatment team. Patient reports he still cannot recall what led to his attempt. His wife did verify that her son's Adderall pills were intact and so patient was unlikely to have ingested them. He states he is doing better today. He denies any further self harm thoughts or plans. He says he would like to follow-up with his psychiatrist Dr. Rodgers upon discharge and he is also willing to seek individual therapy. His wife reports she is fine with him returning home. He was advised that his seizure was most likely a consequence of high dose Wellbutrin and as such this will not be restarted. However, given his long standing history of depression and fleeting self harm thoughts he'll need to resume an SSRI. He appears to have been able to tolerate Celexa and so this will be re instituted at 10 mg daily.   Review of systems:   The Review of Systems is negative other than noted in the HPI     Medications:       LORazepam  1 mg Oral BID     citalopram  10 mg Oral Daily     [START ON 5/27/2017] citalopram  20 mg Oral Daily     sodium chloride (PF)  3 mL Intracatheter Q8H     naloxone, lidocaine, lidocaine 4%, sodium chloride (PF), potassium chloride, potassium chloride, potassium chloride, potassium chloride with lidocaine, potassium chloride, acetaminophen, acetaminophen, ondansetron **OR** ondansetron, senna-docusate, LORazepam      Mental Status Examination:     Appearance:  awake, alert, appeared as age stated and alert  Eye Contact:  fair  Speech:  clear, coherent and normal prosody  Psychomotor Behavior:  no evidence of tardive dyskinesia, dystonia, or tics  Mood:  sad   Affect:  intensity is blunted  Thought Process:  logical, linear and goal oriented no loose associations  Thought Content:  no evidence of suicidal ideation or homicidal ideation  Oriented to:  time, person, and place  Attention Span and  Concentration:  fair  Recent and Remote Memory:  limited  Fund of Knowledge: delayed  Muscle Strength and Tone: normal  Gait and Station: NA  Insight:  limited  Judgment:  limited        Labs/vitals:   No results found for this or any previous visit (from the past 24 hour(s)).  B/P: 110/81, T: 98.6, P: 136, R: 16    Impression:   Malik Smiley is a 42-year-old , father of 1, who also has a stepson.  He has an established history of major depressive disorder and presents to the hospital with seizures in the context of medication overdose.  He recently started a new job and placed his 13-month-old son in  for the first time.  He is unable to identify specific stressors that led to his acute dysphoria and suicide attempt.  He is not expressing any further self-harm thoughts at this point, but he is quite unclear as to why he did what he did.       DIagnoses:     1.  Major depressive disorder, recurrent, severe, without psychotic features.   2.  Adjustment disorder with mixed anxiety and depressed mood.   3.  Cannabis use disorder, uncomplicated.   4.  Rule out amphetamine intoxication.   5.  Seizures (medication induced).              Plan:   1. Written information given on medications. Side effects, risks, benefits reviewed.  2. He is no longer entertaining self harm thoughts or plans and is glad to be with his wife and looking forward to seeing his son.  3. We'll discontinue the 72-hour hold. He'll follow-up with Dr. Rodgers at [facility]. He needs an earlier appointment than his currently scheduled July appointment. He is also willing to see a new therapist at that same facility.  4. I will restart his Celexa at 10 mg today and continue at 20 mg daily thereafter. He may be discharged once he becomes medically stable.      Attestation:  Patient has been seen and evaluated by me,  Brandy Chung MD

## 2017-07-19 ENCOUNTER — APPOINTMENT (OUTPATIENT)
Dept: CT IMAGING | Facility: CLINIC | Age: 42
End: 2017-07-19
Attending: EMERGENCY MEDICINE
Payer: COMMERCIAL

## 2017-07-19 ENCOUNTER — HOSPITAL ENCOUNTER (EMERGENCY)
Facility: CLINIC | Age: 42
Discharge: HOME | End: 2017-07-19
Attending: EMERGENCY MEDICINE | Admitting: EMERGENCY MEDICINE
Payer: COMMERCIAL

## 2017-07-19 VITALS
TEMPERATURE: 98.7 F | RESPIRATION RATE: 12 BRPM | DIASTOLIC BLOOD PRESSURE: 76 MMHG | BODY MASS INDEX: 26.48 KG/M2 | SYSTOLIC BLOOD PRESSURE: 110 MMHG | WEIGHT: 185 LBS | HEIGHT: 70 IN | OXYGEN SATURATION: 100 %

## 2017-07-19 DIAGNOSIS — S80.211A KNEE ABRASION, RIGHT, INITIAL ENCOUNTER: ICD-10-CM

## 2017-07-19 DIAGNOSIS — S09.90XA CLOSED HEAD INJURY, INITIAL ENCOUNTER: ICD-10-CM

## 2017-07-19 LAB
AMPHETAMINES UR QL SCN: NORMAL
ANION GAP SERPL CALCULATED.3IONS-SCNC: 9 MMOL/L (ref 3–14)
BARBITURATES UR QL: NORMAL
BENZODIAZ UR QL: NORMAL
BUN SERPL-MCNC: 15 MG/DL (ref 7–30)
CALCIUM SERPL-MCNC: 8.9 MG/DL (ref 8.5–10.1)
CANNABINOIDS UR QL SCN: NORMAL
CHLORIDE SERPL-SCNC: 108 MMOL/L (ref 94–109)
CO2 SERPL-SCNC: 26 MMOL/L (ref 20–32)
COCAINE UR QL: NORMAL
CREAT SERPL-MCNC: 0.89 MG/DL (ref 0.66–1.25)
GFR SERPL CREATININE-BSD FRML MDRD: NORMAL ML/MIN/1.7M2
GLUCOSE SERPL-MCNC: 88 MG/DL (ref 70–99)
INTERPRETATION ECG - MUSE: NORMAL
OPIATES UR QL SCN: NORMAL
PCP UR QL SCN: NORMAL
POTASSIUM SERPL-SCNC: 4 MMOL/L (ref 3.4–5.3)
SODIUM SERPL-SCNC: 143 MMOL/L (ref 133–144)

## 2017-07-19 PROCEDURE — 80048 BASIC METABOLIC PNL TOTAL CA: CPT | Performed by: EMERGENCY MEDICINE

## 2017-07-19 PROCEDURE — 93005 ELECTROCARDIOGRAM TRACING: CPT

## 2017-07-19 PROCEDURE — 80307 DRUG TEST PRSMV CHEM ANLYZR: CPT | Performed by: EMERGENCY MEDICINE

## 2017-07-19 PROCEDURE — 99285 EMERGENCY DEPT VISIT HI MDM: CPT | Mod: 25

## 2017-07-19 PROCEDURE — 70450 CT HEAD/BRAIN W/O DYE: CPT

## 2017-07-19 ASSESSMENT — ENCOUNTER SYMPTOMS
ACTIVITY CHANGE: 0
SEIZURES: 0

## 2017-07-19 NOTE — ED AVS SNAPSHOT
Emergency Department    [address] [place] [id number]    Phone:  [phone]    Fax:  [phone]                                       Malik Smiley   MRN: [id number]    Department:   Emergency Department   Date of Visit:  7/19/2017           After Visit Summary Signature Page     I have received my discharge instructions, and my questions have been answered. I have discussed any challenges I see with this plan with the nurse or doctor.    ..........................................................................................................................................  Patient/Patient Representative Signature      ..........................................................................................................................................  Patient Representative Print Name and Relationship to Patient    ..................................................               ................................................  Date                                            Time    ..........................................................................................................................................  Reviewed by Signature/Title    ...................................................              ..............................................  Date                                                            Time

## 2017-07-19 NOTE — ED AVS SNAPSHOT
Emergency Department    [address] [place] [address]    Phone:  [phone]    Fax:  [phone]                                       Malik Smiley   MRN: [id number]    Department:   Emergency Department   Date of Visit:  7/19/2017           Patient Information     Date Of Birth          1975        Your diagnoses for this visit were:     Closed head injury, initial encounter     Knee abrasion, right, initial encounter        You were seen by Erich Baker MD.      Follow-up Information     Schedule an appointment as soon as possible for a visit with Teo Morales MD.    Specialty:  Family Practice    Why:  As needed    Contact information:    [address] [place] [place] [id number] [phone]          Discharge Instructions         Abrasions  Abrasions are skin scrapes. Their treatment depends on how large and deep the abrasion is.  Home care  You may be prescribed an antibiotic cream or ointment to apply to the wound. This helps prevent infection. Follow instructions when using this medication.  General care    To care for the abrasion, do the following each day for as long as directed by your health care provider.    If you were given a bandage, change it once a day. If your bandage sticks to the wound, soak it in warm water until it loosens.    Wash the area with soap and warm water. You may do this in a sink or under a tub faucet or shower. Rinse off the soap. Then pat the area dry with a clean towel.    If antibiotic ointment or cream was prescribed, reapply it to the wound as directed. Cover the wound with a fresh non-stick bandage. If the bandage becomes wet or dirty, change it as soon as possible.    You may use acetaminophen or ibuprofen to control pain unless another pain medication was prescribed. Note: If you have chronic liver or kidney disease or ever had a stomach ulcer or GI bleeding, talk with your health care provider  before using these medications. Do not use ibuprofen in children under six months of age.    Most skin wounds heal within ten days. But an infection may occur despite treatment. Therefore, monitor the wound for signs of infection as listed below.  Follow-up care  Follow up with your health care provider, or as advised.  When to seek medical advice  Call your health care provider right away if any of these occur:    Fever of 101 F (38.3 C) or higher, or as directed by your health care provider    Increasing pain, redness, swelling, or drainage from the wound    Bleeding from the wound that does not stop after a few minutes of steady, firm pressure    Decreased ability to move any body part near wound  Date Last Reviewed: 3/22/2015    1987-4326 [employer]. [address] [place] [address], [place] [address]. All rights reserved. This information is not intended as a substitute for professional medical care. Always follow your healthcare professional's instructions.          Discharge References/Attachments     HEAD INJURY, NO WAKE-UP (ADULT) (ENGLISH)      24 Hour Appointment Hotline       To make an appointment at any [facility], call [phone] ([phone]). If you don't have a family doctor or clinic, we will help you find one. [place] clinics are conveniently located to serve the needs of you and your family.             Review of your medicines      Our records show that you are taking the medicines listed below. If these are incorrect, please call your family doctor or clinic.        Dose / Directions Last dose taken    citalopram 20 MG tablet   Commonly known as:  celeXA   Dose:  20 mg   Quantity:  60 tablet        Take 1 tablet (20 mg) by mouth daily   Refills:  0                Procedures and tests performed during your visit     Basic metabolic panel    Drug abuse screen urine    EKG 12 lead    Head CT w/o contrast      Orders Needing Specimen Collection     None      Pending Results      Date and Time Order Name Status Description    7/19/2017 1923 Head CT w/o contrast Preliminary             Pending Culture Results     No orders found from 7/17/2017 to 7/20/2017.            Pending Results Instructions     If you had any lab results that were not finalized at the time of your Discharge, you can call the ED Lab Result RN at [phone]. You will be contacted by this team for any positive Lab results or changes in treatment. The nurses are available 7 days a week from 10A to 6:30P.  You can leave a message 24 hours per day and they will return your call.        Test Results From Your Hospital Stay        7/19/2017  8:12 PM      Narrative     CT SCAN OF THE HEAD WITHOUT CONTRAST   7/19/2017 7:57 PM     HISTORY: Fell and hit head.    TECHNIQUE:  Axial images of the head and coronal reformations without  IV contrast material. Radiation dose for this scan was reduced using  automated exposure control, adjustment of the mA and/or kV according  to patient size, or iterative reconstruction technique.    COMPARISON: 5/24/2017    FINDINGS: There is no evidence of intracranial hemorrhage, mass, acute  infarct or anomaly. The ventricles are normal in size, shape and  configuration. The brain parenchyma and subarachnoid spaces are  normal.     Scattered mucosal thickening within the visualized paranasal sinuses.  There is no evidence of trauma.         Impression     IMPRESSION: No evidence of acute intracranial hemorrhage, mass, or  herniation.         7/19/2017  8:06 PM      Component Results     Component Value Ref Range & Units Status    Sodium 143 133 - 144 mmol/L Final    Potassium 4.0 3.4 - 5.3 mmol/L Final    Chloride 108 94 - 109 mmol/L Final    Carbon Dioxide 26 20 - 32 mmol/L Final    Anion Gap 9 3 - 14 mmol/L Final    Glucose 88 70 - 99 mg/dL Final    Urea Nitrogen 15 7 - 30 mg/dL Final    Creatinine 0.89 0.66 - 1.25 mg/dL Final    GFR Estimate >90  Non  GFR Calc   >60 mL/min/1.7m2  Final    GFR Estimate If Black >90   GFR Calc   >60 mL/min/1.7m2 Final    Calcium 8.9 8.5 - 10.1 mg/dL Final         7/19/2017  8:07 PM      Component Results     Component Value Ref Range & Units Status    Amphetamine Qual Urine  NEG Final    Negative   Cutoff for a negative amphetamine is 500 ng/mL or less.      Barbiturates Qual Urine  NEG Final    Negative   Cutoff for a negative barbiturate is 200 ng/mL or less.      Benzodiazepine Qual Urine  NEG Final    Negative   Cutoff for a negative benzodiazepine is 200 ng/mL or less.      Cannabinoids Qual Urine  NEG Final    Negative   Cutoff for a negative cannabinoid is 50 ng/mL or less.      Cocaine Qual Urine  NEG Final    Negative   Cutoff for a negative cocaine is 300 ng/mL or less.      Opiates Qualitative Urine  NEG Final    Negative   Cutoff for a negative opiate is 300 ng/mL or less.      PCP Qual Urine  NEG Final    Negative   Cutoff for a negative PCP is 25 ng/mL or less.                  Clinical Quality Measure: Blood Pressure Screening     Your blood pressure was checked while you were in the emergency department today. The last reading we obtained was  BP: 110/76 . Please read the guidelines below about what these numbers mean and what you should do about them.  If your systolic blood pressure (the top number) is less than 120 and your diastolic blood pressure (the bottom number) is less than 80, then your blood pressure is normal. There is nothing more that you need to do about it.  If your systolic blood pressure (the top number) is 120-139 or your diastolic blood pressure (the bottom number) is 80-89, your blood pressure may be higher than it should be. You should have your blood pressure rechecked within a year by a primary care provider.  If your systolic blood pressure (the top number) is 140 or greater or your diastolic blood pressure (the bottom number) is 90 or greater, you may have high blood pressure. High blood pressure is  treatable, but if left untreated over time it can put you at risk for heart attack, stroke, or kidney failure. You should have your blood pressure rechecked by a primary care provider within the next 4 weeks.  If your provider in the emergency department today gave you specific instructions to follow-up with your doctor or provider even sooner than that, you should follow that instruction and not wait for up to 4 weeks for your follow-up visit.        Thank you for choosing [facility]       Thank you for choosing [facility] for your care. Our goal is always to provide you with excellent care. Hearing back from our patients is one way we can continue to improve our services. Please take a few minutes to complete the written survey that you may receive in the mail after you visit with us. Thank you!        [employer] Information     [employer] gives you secure access to your electronic health record. If you see a primary care provider, you can also send messages to your care team and make appointments. If you have questions, please call your primary care clinic.  If you do not have a primary care provider, please call [phone] and they will assist you.        Care EveryWhere ID     This is your Care EveryWhere ID. This could be used by other organizations to access your [facility] medical records  [id number]        Equal Access to Services     JOSSE POZO : Kingston Arguello, hyun ferguson, vadim arrington, young charles. So [facility] [phone].    ATENCIÓN: Si habla español, tiene a burch disposición servicios gratuitos de asistencia lingüística. Peter al [phone].    We comply with applicable federal civil rights laws and [place] laws. We do not discriminate on the basis of race, color, national origin, age, disability sex, sexual orientation or gender identity.            After Visit Summary       This is your record. Keep this with you and show to your community  pharmacist(s) and doctor(s) at your next visit.

## 2017-07-20 NOTE — ED PROVIDER NOTES
"  History     Chief Complaint:  Altered Mental Status    HPI   Malik Smiley is a 42 year old male, with a history of seizures, who presents to the emergency department today for evaluation of altered mental status. The patient reports he had seizures 6 weeks ago when he overdosed on medications.The patient reports he was as at work today and does not recall what has happened since 1200 today per patient and wife. He thinks he may have hit his head at work and fell sustaining an abrasion to his right knee. Some memory is coming back since this time. Per wife, at 1215, she talked to him on the phone and he was normal. An order for an Uber was made at 1500 and at 1615, he spoke with his wife again and was unsure of the days events since their last call. The patient stated, \"my actions don't make sense to me.\" and asserts his symptoms are similar to when he had seizures. He denies using new medications or recreational drugs.     Allergies:  No Known Drug Allergies    Medications:    Celexa  Abilify      Past Medical History:    Anxiety  Depressive disorder    Past Surgical History:    No past surgical history on file.    Family History:      History reviewed. No pertinent family history.     Social History:  The patient was accompanied to the ED by wife.  Smoking Status: Never  Alcohol Use: Yes  Marital Status:        Review of Systems   Constitutional: Negative for activity change.   Neurological: Positive for syncope. Negative for seizures.   All other systems reviewed and are negative.    Physical Exam   First Vitals:  BP: 110/76  Heart Rate: 77  Temp: 98.7  F (37.1  C)  Resp: 12  Height: 177.8 cm (5' 10\")  Weight: 83.9 kg (185 lb)  SpO2: 100 %      Physical Exam  Constitutional: White male supine  HENT: Mild tenderness in forehead without swelling or redness.    Eyes: EOM are normal. Pupils are equal, round, and reactive to light.   Neck: No posterior midline tenderness. Normal range of motion. No JVD " present. No cervical adenopathy.  Cardiovascular: Regular rhythm.  Exam reveals no gallop and no friction rub.    No murmur heard.  Pulmonary/Chest: Bilateral breath sounds normal. No wheezes, rhonchi or rales.  Abdominal: Soft. No tenderness. No rebound or guarding.   Musculoskeletal: Superficial abrasion on patellar overlaying tendon. No knee effusion or disc laxity. No edema. No tenderness.   Lymphadenopathy: No lymphadenopathy.   Neurological: GCS 15. Fluent speech. No facial asymmetry. Normal facial sensation. Alert and oriented to person, place, and time. Normal strength. Coordination normal.   Skin: Skin is warm and dry. No rash noted. No erythema.     Emergency Department Course     ECG:  Completed at 1928.    Normal Sinus rhythm  Normal ECG  Rate 69 bpm. OH interval 144. QRS duration 82. QT/QTc 398/426. P-R-T axes 50 -6 44.    Imaging:  Radiology findings were communicated with the patient and family who voiced understanding of the findings.  Head CT w/o Contrast  IMPRESSION: No evidence of acute intracranial hemorrhage, mass, or  Herniation.  Report per radiology     Laboratory:  Laboratory findings were communicated with the patient and family who voiced understanding of the findings.  BMP: WNL (Creatinine 0.89)  Drug abuse screen 77 urine: All negative    Emergency Department Course:  Nursing notes and vitals reviewed.  The patient was sent for a Heat CT w/o Contrast while in the emergency department, results above.   IV was inserted and blood was drawn for laboratory testing, results above.  1920: I performed an exam of the patient as documented above.  2030: Patient rechecked and updated.   I discussed the treatment plan with the patient. They expressed understanding of this plan and consented to discharge. They will be discharged home with instructions for care and follow up. In addition, the patient will return to the emergency department if their symptoms persist, worsen, if new symptoms arise or if  there is any concern.  All questions were answered.  I personally reviewed the laboratory and imaging results with the Patient and significant other and answered all related questions prior to discharge.    Impression & Plan      Medical Decision Making:  Malik Smiley is a 42 year old male who remembers falling and being helped up from the ground and then remembers a headache and a rip in his jeans on the right side and not much else. Recently he had been in the hospital because of a drug overdose with Wellbutrin and seizures. He denies taking any extra Wellbutrin and any Wellbutrin at all today. He did not bite his tongue or have urinary incontinence but he does have a small scrape on his right knee and some mild tenderness on his forehead.  He is however awake and alert. He states his memory is coming back.  CT, ECG, blood work and U tox screen are all unremarkable. The patient appears stable for discharge. I do not think this is a recurrent seizure or an attempt to harm himself but most likely was a fall with closed head trauma. His wife is here and she will take him home and watch him tonight. I recommended that he follow up with his primary MD in the next week and if he has any memory problems he should avoid driving or operating machinery.       Diagnosis:    ICD-10-CM    1. Closed head injury, initial encounter S09.90XA    2. Knee abrasion, right, initial encounter S80.211A      Disposition:  Discharged to home     Scribe Disclosure:  I, Janette Reyes, am serving as a scribe at 7:14 PM on 7/19/2017 to document services personally performed by Erich Baker MD based on my observations and the provider's statements to me.     7/19/2017    EMERGENCY DEPARTMENT       Erich Baker MD  07/20/17 0033

## 2017-07-20 NOTE — DISCHARGE INSTRUCTIONS
Abrasions  Abrasions are skin scrapes. Their treatment depends on how large and deep the abrasion is.  Home care  You may be prescribed an antibiotic cream or ointment to apply to the wound. This helps prevent infection. Follow instructions when using this medication.  General care    To care for the abrasion, do the following each day for as long as directed by your health care provider.    If you were given a bandage, change it once a day. If your bandage sticks to the wound, soak it in warm water until it loosens.    Wash the area with soap and warm water. You may do this in a sink or under a tub faucet or shower. Rinse off the soap. Then pat the area dry with a clean towel.    If antibiotic ointment or cream was prescribed, reapply it to the wound as directed. Cover the wound with a fresh non-stick bandage. If the bandage becomes wet or dirty, change it as soon as possible.    You may use acetaminophen or ibuprofen to control pain unless another pain medication was prescribed. Note: If you have chronic liver or kidney disease or ever had a stomach ulcer or GI bleeding, talk with your health care provider before using these medications. Do not use ibuprofen in children under six months of age.    Most skin wounds heal within ten days. But an infection may occur despite treatment. Therefore, monitor the wound for signs of infection as listed below.  Follow-up care  Follow up with your health care provider, or as advised.  When to seek medical advice  Call your health care provider right away if any of these occur:    Fever of 101 F (38.3 C) or higher, or as directed by your health care provider    Increasing pain, redness, swelling, or drainage from the wound    Bleeding from the wound that does not stop after a few minutes of steady, firm pressure    Decreased ability to move any body part near wound  Date Last Reviewed: 3/22/2015    2227-6324 [employer] [address] [facility], [place]  [address]. All rights reserved. This information is not intended as a substitute for professional medical care. Always follow your healthcare professional's instructions.

## 2017-07-20 NOTE — ED NOTES
Bed: ED21  Expected date:   Expected time:   Means of arrival:   Comments:  Hold triage alt mental state .

## 2019-11-07 ENCOUNTER — HEALTH MAINTENANCE LETTER (OUTPATIENT)
Age: 44
End: 2019-11-07

## 2020-12-06 ENCOUNTER — HEALTH MAINTENANCE LETTER (OUTPATIENT)
Age: 45
End: 2020-12-06

## 2021-09-25 ENCOUNTER — HEALTH MAINTENANCE LETTER (OUTPATIENT)
Age: 46
End: 2021-09-25

## 2022-01-15 ENCOUNTER — HEALTH MAINTENANCE LETTER (OUTPATIENT)
Age: 47
End: 2022-01-15

## 2022-12-26 ENCOUNTER — HEALTH MAINTENANCE LETTER (OUTPATIENT)
Age: 47
End: 2022-12-26

## 2023-04-22 ENCOUNTER — HEALTH MAINTENANCE LETTER (OUTPATIENT)
Age: 48
End: 2023-04-22

## 2025-03-08 ENCOUNTER — VIRTUAL VISIT (OUTPATIENT)
Dept: URGENT CARE | Facility: CLINIC | Age: 50
End: 2025-03-08
Payer: COMMERCIAL

## 2025-03-08 DIAGNOSIS — B00.1 COLD SORE: Primary | ICD-10-CM

## 2025-03-08 PROCEDURE — 98001 SYNCH AUDIO-VIDEO NEW LOW 30: CPT

## 2025-03-08 RX ORDER — VALACYCLOVIR HYDROCHLORIDE 1 G/1
2000 TABLET, FILM COATED ORAL 2 TIMES DAILY
Qty: 4 TABLET | Refills: 0 | Status: SHIPPED | OUTPATIENT
Start: 2025-03-08 | End: 2025-03-09

## 2025-03-09 NOTE — PROGRESS NOTES
Malik is a 50 year old male who presents for a billable video visit.    ASSESSMENT/PLAN:  Diagnoses and all orders for this visit:    Cold sore  -     valACYclovir (VALTREX) 1000 mg tablet; Take 2 tablets (2,000 mg) by mouth 2 times daily for 1 day.        Follow up with primary care provider with any problems, questions or concerns or if symptoms worsen or fail to improve. Patient agreed to plan and verbalized understanding.     SUBJECTIVE:    Patient reports recurrent cold sore on the bottom lip which started one day ago. He has been treated with Valtrex in the past with resolution of symptoms. No fever, chills.    ROS: Pertinent ROS neg other than the symptoms noted above in the HPI.     OBJECTIVE:  Vitals not done due to this being a virtual visit    GENERAL: healthy, alert and no distress  EYES: Eyes grossly normal to inspection,conjunctivae and sclerae normal  RESP: Able to speak in complete sentences, no audible wheeze or cough  SKIN:vesicular rash on the lower lip  NEURO: mentation intact and speech normal  PSYCH: mentation appears normal, affect normal/bright    Video-Visit Details    Type of service:  Video Visit  Video Start Time: 7:55 pm  Video End Time: 7:59 pm    Originating Location: Home    Distant Location:  [facility] VIRTUAL URGENT CARE     Platform used for Video Visit: Divya Hutchins PA-C

## 2025-03-09 NOTE — PATIENT INSTRUCTIONS
Advised on adequate pain control with oral acetaminophen and ibuprofen to facilitate fluid and solid intake  For cold sores on the lips  Apply barrier cream such as petroleum jelly

## 2025-03-29 ENCOUNTER — HEALTH MAINTENANCE LETTER (OUTPATIENT)
Age: 50
End: 2025-03-29